# Patient Record
Sex: FEMALE | Race: BLACK OR AFRICAN AMERICAN | NOT HISPANIC OR LATINO | Employment: FULL TIME | URBAN - METROPOLITAN AREA
[De-identification: names, ages, dates, MRNs, and addresses within clinical notes are randomized per-mention and may not be internally consistent; named-entity substitution may affect disease eponyms.]

---

## 2018-12-27 ENCOUNTER — TELEPHONE (OUTPATIENT)
Dept: GYNECOLOGIC ONCOLOGY | Facility: HOSPITAL | Age: 48
End: 2018-12-27

## 2018-12-27 RX ORDER — FLUCONAZOLE 150 MG/1
150 TABLET ORAL ONCE
Qty: 1 TABLET | Refills: 3 | Status: SHIPPED | OUTPATIENT
Start: 2018-12-27 | End: 2018-12-27

## 2018-12-27 NOTE — TELEPHONE ENCOUNTER
Reached patient on her cell phone. Gave her the results of her benign EMBX. Patient has appropriate follow-up scheduled. Did complaint of a yeast infection.  RX sent to [facility].     Alvin Johnson M.D.  PGY2 OB/GYN

## 2019-01-16 ENCOUNTER — TELEPHONE (OUTPATIENT)
Dept: ADMINISTRATIVE | Facility: HOSPITAL | Age: 49
End: 2019-01-16

## 2019-01-21 ENCOUNTER — TELEPHONE (OUTPATIENT)
Dept: ADMINISTRATIVE | Facility: HOSPITAL | Age: 49
End: 2019-01-21

## 2019-01-23 PROBLEM — N93.9 ABNORMAL UTERINE BLEEDING (AUB): Status: ACTIVE | Noted: 2019-01-23

## 2019-03-26 PROBLEM — D21.9 LEIOMYOMA: Status: ACTIVE | Noted: 2019-03-26

## 2019-04-01 ENCOUNTER — PATIENT OUTREACH (OUTPATIENT)
Dept: ADMINISTRATIVE | Facility: HOSPITAL | Age: 49
End: 2019-04-01

## 2019-07-12 PROBLEM — N93.9 ABNORMAL UTERINE BLEEDING (AUB): Chronic | Status: ACTIVE | Noted: 2019-01-23

## 2019-07-15 PROBLEM — Z98.890 S/P ENDOMETRIAL ABLATION: Status: ACTIVE | Noted: 2019-07-15

## 2021-02-10 PROBLEM — R93.5 ABNORMAL ENDOMETRIAL ULTRASOUND: Status: ACTIVE | Noted: 2021-02-10

## 2021-02-10 PROBLEM — R10.2 PELVIC PAIN: Status: ACTIVE | Noted: 2021-02-10

## 2021-02-17 PROBLEM — Z90.710 STATUS POST TOTAL ABDOMINAL HYSTERECTOMY: Status: ACTIVE | Noted: 2021-02-17

## 2021-02-18 PROBLEM — R50.82 POSTOPERATIVE FEVER: Status: ACTIVE | Noted: 2021-02-18

## 2021-02-18 PROBLEM — K91.89 ILEUS, POSTOPERATIVE: Status: ACTIVE | Noted: 2021-02-18

## 2021-02-18 PROBLEM — N17.9 AKI (ACUTE KIDNEY INJURY): Status: ACTIVE | Noted: 2021-02-18

## 2021-02-18 PROBLEM — K56.7 ILEUS, POSTOPERATIVE: Status: ACTIVE | Noted: 2021-02-18

## 2021-02-19 PROBLEM — E87.6 HYPOKALEMIA: Status: ACTIVE | Noted: 2021-02-19

## 2021-02-19 PROBLEM — I10 ESSENTIAL HYPERTENSION: Status: ACTIVE | Noted: 2021-02-19

## 2021-02-19 PROBLEM — D62 ACUTE BLOOD LOSS AS CAUSE OF POSTOPERATIVE ANEMIA: Status: ACTIVE | Noted: 2021-02-19

## 2021-05-06 ENCOUNTER — PATIENT MESSAGE (OUTPATIENT)
Dept: RESEARCH | Facility: HOSPITAL | Age: 51
End: 2021-05-06

## 2021-05-10 ENCOUNTER — PATIENT MESSAGE (OUTPATIENT)
Dept: RESEARCH | Facility: HOSPITAL | Age: 51
End: 2021-05-10

## 2022-01-19 ENCOUNTER — LAB VISIT (OUTPATIENT)
Dept: PRIMARY CARE CLINIC | Facility: OTHER | Age: 52
End: 2022-01-19
Attending: INTERNAL MEDICINE
Payer: MEDICAID

## 2022-01-19 DIAGNOSIS — Z11.52 ENCOUNTER FOR SCREENING FOR SEVERE ACUTE RESPIRATORY SYNDROME CORONAVIRUS 2 (SARS-COV-2) INFECTION: Primary | ICD-10-CM

## 2022-01-19 LAB
CTP QC/QA: YES
SARS-COV-2 AG RESP QL IA.RAPID: POSITIVE

## 2022-01-19 PROCEDURE — 87811 SARS-COV-2 COVID19 W/OPTIC: CPT

## 2022-03-07 DIAGNOSIS — Z12.31 OTHER SCREENING MAMMOGRAM: ICD-10-CM

## 2022-04-04 ENCOUNTER — PATIENT MESSAGE (OUTPATIENT)
Dept: ADMINISTRATIVE | Facility: HOSPITAL | Age: 52
End: 2022-04-04
Payer: MEDICAID

## 2022-04-13 DIAGNOSIS — Z12.11 COLON CANCER SCREENING: ICD-10-CM

## 2022-04-28 ENCOUNTER — HOSPITAL ENCOUNTER (EMERGENCY)
Facility: HOSPITAL | Age: 52
Discharge: HOME | End: 2022-04-28
Attending: EMERGENCY MEDICINE
Payer: MEDICAID

## 2022-04-28 VITALS
RESPIRATION RATE: 18 BRPM | HEART RATE: 74 BPM | OXYGEN SATURATION: 100 % | WEIGHT: 189.81 LBS | TEMPERATURE: 98 F | HEIGHT: 64 IN | SYSTOLIC BLOOD PRESSURE: 145 MMHG | BODY MASS INDEX: 32.41 KG/M2 | DIASTOLIC BLOOD PRESSURE: 73 MMHG

## 2022-04-28 DIAGNOSIS — R51.9 OCCIPITAL HEADACHE: Primary | ICD-10-CM

## 2022-04-28 DIAGNOSIS — R03.0 ELEVATED BLOOD PRESSURE READING: ICD-10-CM

## 2022-04-28 DIAGNOSIS — R06.02 SHORTNESS OF BREATH: ICD-10-CM

## 2022-04-28 LAB
ALBUMIN SERPL BCP-MCNC: 4.4 G/DL (ref 3.5–5.2)
ALP SERPL-CCNC: 84 U/L (ref 38–126)
ALT SERPL W/O P-5'-P-CCNC: 13 U/L (ref 10–44)
ANION GAP SERPL CALC-SCNC: 10 MMOL/L (ref 8–16)
AST SERPL-CCNC: 20 U/L (ref 15–46)
BASOPHILS # BLD AUTO: 0.03 K/UL (ref 0–0.2)
BASOPHILS NFR BLD: 0.5 % (ref 0–1.9)
BILIRUB SERPL-MCNC: 0.2 MG/DL (ref 0.1–1)
CALCIUM SERPL-MCNC: 8.9 MG/DL (ref 8.7–10.5)
CHLORIDE SERPL-SCNC: 106 MMOL/L (ref 95–110)
CO2 SERPL-SCNC: 25 MMOL/L (ref 23–29)
CREAT SERPL-MCNC: 0.64 MG/DL (ref 0.5–1.4)
DIFFERENTIAL METHOD: NORMAL
EOSINOPHIL # BLD AUTO: 0.2 K/UL (ref 0–0.5)
EOSINOPHIL NFR BLD: 2.9 % (ref 0–8)
ERYTHROCYTE [DISTWIDTH] IN BLOOD BY AUTOMATED COUNT: 12.5 % (ref 11.5–14.5)
EST. GFR  (AFRICAN AMERICAN): >60 ML/MIN/1.73 M^2
EST. GFR  (NON AFRICAN AMERICAN): >60 ML/MIN/1.73 M^2
GLUCOSE SERPL-MCNC: 100 MG/DL (ref 70–110)
HCT VFR BLD AUTO: 37.3 % (ref 37–48.5)
HGB BLD-MCNC: 12.5 G/DL (ref 12–16)
IMM GRANULOCYTES # BLD AUTO: 0.01 K/UL (ref 0–0.04)
IMM GRANULOCYTES NFR BLD AUTO: 0.2 % (ref 0–0.5)
LYMPHOCYTES # BLD AUTO: 1.4 K/UL (ref 1–4.8)
LYMPHOCYTES NFR BLD: 25.1 % (ref 18–48)
MCH RBC QN AUTO: 29 PG (ref 27–31)
MCHC RBC AUTO-ENTMCNC: 33.5 G/DL (ref 32–36)
MCV RBC AUTO: 87 FL (ref 82–98)
MONOCYTES # BLD AUTO: 0.6 K/UL (ref 0.3–1)
MONOCYTES NFR BLD: 9.8 % (ref 4–15)
NEUTROPHILS # BLD AUTO: 3.5 K/UL (ref 1.8–7.7)
NEUTROPHILS NFR BLD: 61.5 % (ref 38–73)
NRBC BLD-RTO: 0 /100 WBC
NT-PROBNP SERPL-MCNC: 34 PG/ML (ref 5–450)
PLATELET # BLD AUTO: 191 K/UL (ref 150–450)
PMV BLD AUTO: 10.8 FL (ref 9.2–12.9)
POTASSIUM SERPL-SCNC: 3.6 MMOL/L (ref 3.5–5.1)
PROT SERPL-MCNC: 7.8 G/DL (ref 6–8.4)
RBC # BLD AUTO: 4.31 M/UL (ref 4–5.4)
SODIUM SERPL-SCNC: 141 MMOL/L (ref 136–145)
TROPONIN I SERPL-MCNC: <0.012 NG/ML (ref 0.01–0.03)
UUN UR-MCNC: 10 MG/DL (ref 7–17)
WBC # BLD AUTO: 5.61 K/UL (ref 3.9–12.7)

## 2022-04-28 PROCEDURE — 83880 ASSAY OF NATRIURETIC PEPTIDE: CPT | Mod: ER | Performed by: PHYSICIAN ASSISTANT

## 2022-04-28 PROCEDURE — 93005 ELECTROCARDIOGRAM TRACING: CPT | Mod: ER

## 2022-04-28 PROCEDURE — 25000003 PHARM REV CODE 250: Mod: ER | Performed by: PHYSICIAN ASSISTANT

## 2022-04-28 PROCEDURE — 93010 ELECTROCARDIOGRAM REPORT: CPT | Mod: ,,, | Performed by: INTERNAL MEDICINE

## 2022-04-28 PROCEDURE — 80053 COMPREHEN METABOLIC PANEL: CPT | Mod: ER | Performed by: PHYSICIAN ASSISTANT

## 2022-04-28 PROCEDURE — 84484 ASSAY OF TROPONIN QUANT: CPT | Mod: ER | Performed by: PHYSICIAN ASSISTANT

## 2022-04-28 PROCEDURE — 93010 EKG 12-LEAD: ICD-10-PCS | Mod: ,,, | Performed by: INTERNAL MEDICINE

## 2022-04-28 PROCEDURE — 85025 COMPLETE CBC W/AUTO DIFF WBC: CPT | Mod: ER | Performed by: PHYSICIAN ASSISTANT

## 2022-04-28 PROCEDURE — 99285 EMERGENCY DEPT VISIT HI MDM: CPT | Mod: 25,ER

## 2022-04-28 RX ORDER — ACETAMINOPHEN 500 MG
1000 TABLET ORAL
Status: COMPLETED | OUTPATIENT
Start: 2022-04-28 | End: 2022-04-28

## 2022-04-28 RX ADMIN — ACETAMINOPHEN 1000 MG: 500 TABLET ORAL at 12:04

## 2022-04-28 NOTE — Clinical Note
"Yoni"Shikha Tena was seen and treated in our emergency department on 4/28/2022.  She may return to work on 04/30/2022.       If you have any questions or concerns, please don't hesitate to call.      CATRACHITA Peres"

## 2022-04-28 NOTE — DISCHARGE INSTRUCTIONS
Take Blood pressure log, write down blood pressure twice per day and follow up with primary care physician within 1 week to discuss need for starting antihypertensive (BP lowering) medications     Return to the ER for any change or worsening of symptoms.

## 2022-04-28 NOTE — ED PROVIDER NOTES
"Encounter Date: 4/28/2022       History     Chief Complaint   Patient presents with    Hypertension     PT reports Tuesday blood pressure was high. PT reports today she started to get a headache and BP was 145/101. PT reports "alittle bit of SOB" Denies chest     50 y/o female with history of back pain and iron deficiency anemia presents to the ER with complaint of elevated blood pressure.  She reports that she is "working too much and maybe a sinus headache, not an out of ordinary headache".  She says that her head feels "tight and stuffy". She had her BP checked at work due to headache and noted to have BP of 145/102.  She reports headache intermittent for a few days in occipital area. She denies neck stiffness, nausea, vomiting, or fever.   She denies chest pain.  She reports mild shortness of breath and describes this as feeling fatigued.    She denies nausea, vomiting, fever or cough. She denies vision changes, weakness or numbness of extremities.  She drove herself to the ER from work today.    She was previously on HCTZ for leg swelling , but is no longer prescribed this medication.   She was seen by pain management clinic 2 days ago for consultation for cortisone injections, her BP was elevated at that time 140s/100.  No other complaints at this time.          Review of patient's allergies indicates:  No Known Allergies  Past Medical History:   Diagnosis Date    Abnormal uterine bleeding (AUB)     Back pain     CHA (iron deficiency anemia)     Pelvic pain      Past Surgical History:   Procedure Laterality Date    DILATION AND CURETTAGE OF UTERUS USING SUCTION N/A 2/10/2021    Procedure: DILATION AND CURETTAGE, UTERUS, USING SUCTION;  Surgeon: Cristobal Song MD;  [facility];  Service: OB/GYN;  Laterality: N/A;    HYSTEROSCOPY N/A 7/15/2019    Procedure: HYSTEROSCOPY;  Surgeon: Roseann Lauren MD;  [facility];  Service: OB/GYN;  Laterality: N/A;    HYSTEROSCOPY WITH DILATION AND " CURETTAGE OF UTERUS N/A 2/10/2021    Procedure: HYSTEROSCOPY, WITH DILATION AND CURETTAGE OF UTERUS;  Surgeon: Cristobal Song MD;  Location: [facility] OR;  Service: OB/GYN;  Laterality: N/A;    SALPINGECTOMY Right 2/10/2021    Procedure: SALPINGECTOMY;  Surgeon: Cristobal Song MD;  Location: [facility] OR;  Service: OB/GYN;  Laterality: Right;    SALPINGOOPHORECTOMY Left 2/10/2021    Procedure: SALPINGO-OOPHORECTOMY;  Surgeon: Cristobal Song MD;  Location: [facility] OR;  Service: OB/GYN;  Laterality: Left;  with Left Ovarian cystectomy    THERMAL ABLATION OF ENDOMETRIUM USING HYSTEROSCOPY N/A 7/15/2019    Procedure: ABLATION, ENDOMETRIUM, THERMAL, HYSTEROSCOPIC;  Surgeon: Roseann Lauren MD;  Location: [facility] OR;  Service: OB/GYN;  Laterality: N/A;    TOTAL ABDOMINAL HYSTERECTOMY N/A 2/10/2021    Procedure: HYSTERECTOMY, TOTAL, ABDOMINAL;  Surgeon: Cristobal Song MD;  Location[facility];  Service: OB/GYN;  Laterality: N/A;  DONE  EMERGENCY     TUBAL LIGATION       Family History   Problem Relation Age of Onset    Lupus Mother     No Known Problems Father      Social History     Tobacco Use    Smoking status: Former Smoker     Packs/day: 0.30    Smokeless tobacco: Never Used    Tobacco comment: 3 cigarettes per day   Substance Use Topics    Alcohol use: Yes     Comment: occasionally    Drug use: No     Review of Systems   Constitutional: Negative for chills and fever.   HENT: Negative for sore throat.    Respiratory: Positive for shortness of breath.    Cardiovascular: Negative for chest pain.   Gastrointestinal: Negative for abdominal pain, nausea and vomiting.   Genitourinary: Negative for dysuria.   Musculoskeletal: Negative for back pain.   Skin: Negative for rash.   Neurological: Positive for headaches. Negative for dizziness, syncope, weakness and light-headedness.   Hematological: Does not bruise/bleed easily.   Psychiatric/Behavioral: Negative for confusion.       Physical Exam     Initial Vitals [04/28/22 1121]   BP  Pulse Resp Temp SpO2   (!) 161/78 80 18 98.1 °F (36.7 °C) 100 %      MAP       --         Physical Exam    Nursing note and vitals reviewed.  Constitutional: She appears well-developed and well-nourished.   HENT:   Head: Atraumatic.   Eyes: Conjunctivae and EOM are normal. Pupils are equal, round, and reactive to light.   Neck: Neck supple.   Normal range of motion.  Cardiovascular: Normal rate, regular rhythm and intact distal pulses.   Pulmonary/Chest: Breath sounds normal. No respiratory distress. She has no wheezes. She has no rales.   Abdominal: Abdomen is soft. Bowel sounds are normal. There is no abdominal tenderness.   Musculoskeletal:      Cervical back: Normal range of motion and neck supple.     Neurological: She is alert and oriented to person, place, and time. She has normal strength. No cranial nerve deficit or sensory deficit. GCS score is 15. GCS eye subscore is 4. GCS verbal subscore is 5. GCS motor subscore is 6.   Skin: Capillary refill takes less than 2 seconds. No rash noted.   Psychiatric: She has a normal mood and affect.         ED Course   Procedures  Labs Reviewed   CBC W/ AUTO DIFFERENTIAL   COMPREHENSIVE METABOLIC PANEL   NT-PRO NATRIURETIC PEPTIDE   TROPONIN I          Imaging Results          X-Ray Chest AP Portable (Final result)  Result time 04/28/22 12:24:36    Final result by Norman Russell MD (04/28/22 12:24:36)                 Impression:      No acute abnormality.  No significant interval change.      Electronically signed by: Nroman Russell  Date:    04/28/2022  Time:    12:24             Narrative:    EXAMINATION:  XR CHEST AP PORTABLE    CLINICAL HISTORY:  Shortness of breath    TECHNIQUE:  Single frontal view of the chest was performed.    COMPARISON:  Chest radiograph 02/15/2021.    FINDINGS:  Low lung volumes.The lungs are otherwise clear, with normal appearance of pulmonary vasculature and no pleural effusion or pneumothorax.    The cardiac silhouette is normal in  "size. The hilar and mediastinal contours are unremarkable.    Bones are intact.                                 Medications   acetaminophen tablet 1,000 mg (1,000 mg Oral Given 4/28/22 1214)           APC / Resident Notes:   The patient's blood pressure is elevated here in the emergency department to 161/78. She presented to the ER with complaint of elevated BP and mild headache at work today.   She reports mild discomfort in occipital area rated 3/10.  She is given tylenol with improvement.  BP also improved to 145/73.  I reports feeling of sinus congestion.  I have advised that she take zyrtec and tylenol at home.     I reviewed the patient's chart. Patient seen in the ED on 10/2020 with headache, elevated blood pressure to 170/104 states no history of HTN and stated that normally her BP runs 130/60.  She was on HCTZ prn for intermittent leg swelling, but has been off of this medication by instruction of her PCP.    During ED visit 10/2020 patient had a normal CT of head with "Mild left maxillary sinus mucosal thickening."      I have obtained blood work.  CBC, CMP, troponin, BMP are within normal limits.  EKG shows normal sinus rhythm, no evidence of acute ischemia.  Chest x-ray is negative for acute or infectious process.I do not suspect ACS or other acute cardiopulmonary process.     Based on the patient's presentation today I do not see any signs of endorgan damage representing hypertensive urgency or emergency.  I do not think the patient's presentation necessitates further intervention in the Emergency Department to acutely decrease her blood pressure.  She will likely need to start antihypertensives with her primary care, but I have advised that she take a blood pressure log at home and follow-up with her primary care physician within 1 week for ER follow-up exam and repeat blood prsesure .  Patient is comfortable with this plan.  She is given ER return precautions.  I discussed the care this patient my " supervising physician.                   Clinical Impression:   Final diagnoses:  [R03.0] Elevated blood pressure reading  [R06.02] Shortness of breath  [R51.9] Occipital headache (Primary)          ED Disposition Condition    Discharge Stable        ED Prescriptions     None        Follow-up Information     Follow up With Specialties Details Why Contact Info    Zee Plascencia NP Internal Medicine Call in 1 day To discuss ER visit and schedule follow up appointment within 1 week for repeat Blood pressure and ED follow up exam [address] [facility] [id number]  [phone]             CATRACHITA Peres  04/28/22 1344

## 2022-07-11 ENCOUNTER — PATIENT MESSAGE (OUTPATIENT)
Dept: ADMINISTRATIVE | Facility: HOSPITAL | Age: 52
End: 2022-07-11
Payer: MEDICAID

## 2022-10-03 ENCOUNTER — PATIENT MESSAGE (OUTPATIENT)
Dept: ADMINISTRATIVE | Facility: HOSPITAL | Age: 52
End: 2022-10-03
Payer: MEDICAID

## 2022-10-25 ENCOUNTER — PATIENT OUTREACH (OUTPATIENT)
Dept: ADMINISTRATIVE | Facility: HOSPITAL | Age: 52
End: 2022-10-25
Payer: MEDICAID

## 2022-10-25 DIAGNOSIS — I10 ESSENTIAL HYPERTENSION: Primary | ICD-10-CM

## 2022-12-10 ENCOUNTER — LAB VISIT (OUTPATIENT)
Dept: LAB | Facility: HOSPITAL | Age: 52
End: 2022-12-10
Payer: MEDICAID

## 2022-12-10 DIAGNOSIS — Z12.11 COLON CANCER SCREENING: ICD-10-CM

## 2022-12-15 LAB — HEMOCCULT STL QL IA: NEGATIVE

## 2022-12-15 PROCEDURE — 82274 ASSAY TEST FOR BLOOD FECAL: CPT | Performed by: NURSE PRACTITIONER

## 2022-12-23 ENCOUNTER — PATIENT OUTREACH (OUTPATIENT)
Dept: ADMINISTRATIVE | Facility: HOSPITAL | Age: 52
End: 2022-12-23
Payer: MEDICAID

## 2022-12-23 NOTE — PROGRESS NOTES
Chart reviewed, immunization record updated.  No new results noted on [facility] or [facility] web site.  Care Everywhere updated.   Patient care coordination note  Next PCP visit 06/06/2023  LOV with PCP 12/06/2022  Contacted pt and scheduled another mmg

## 2023-02-06 ENCOUNTER — PATIENT OUTREACH (OUTPATIENT)
Dept: ADMINISTRATIVE | Facility: HOSPITAL | Age: 53
End: 2023-02-06
Payer: MEDICAID

## 2023-02-06 DIAGNOSIS — Z00.00 ENCOUNTER FOR PREVENTIVE CARE: Primary | ICD-10-CM

## 2023-04-03 ENCOUNTER — PATIENT MESSAGE (OUTPATIENT)
Dept: ADMINISTRATIVE | Facility: HOSPITAL | Age: 53
End: 2023-04-03
Payer: MEDICAID

## 2023-04-13 PROBLEM — R20.0 LEFT FACIAL NUMBNESS: Status: ACTIVE | Noted: 2023-04-13

## 2023-04-13 PROBLEM — R03.0 ELEVATED BLOOD PRESSURE READING: Status: ACTIVE | Noted: 2023-04-13

## 2023-06-06 PROBLEM — N93.9 ABNORMAL UTERINE BLEEDING (AUB): Chronic | Status: RESOLVED | Noted: 2019-01-23 | Resolved: 2023-06-06

## 2023-06-06 PROBLEM — R93.5 ABNORMAL ENDOMETRIAL ULTRASOUND: Status: RESOLVED | Noted: 2021-02-10 | Resolved: 2023-06-06

## 2023-06-06 PROBLEM — Z98.890 S/P ENDOMETRIAL ABLATION: Status: RESOLVED | Noted: 2019-07-15 | Resolved: 2023-06-06

## 2023-07-17 ENCOUNTER — CLINICAL SUPPORT (OUTPATIENT)
Dept: FAMILY MEDICINE | Facility: CLINIC | Age: 53
End: 2023-07-17

## 2023-07-17 DIAGNOSIS — Z00.00 GENERAL MEDICAL EXAM: Primary | ICD-10-CM

## 2023-07-17 PROCEDURE — 80305 PR NON-DOT DRUG SCREENS: ICD-10-PCS | Mod: S$GLB,,, | Performed by: FAMILY MEDICINE

## 2023-07-17 PROCEDURE — 80305 DRUG TEST PRSMV DIR OPT OBS: CPT | Mod: S$GLB,,, | Performed by: FAMILY MEDICINE

## 2023-07-17 PROCEDURE — 99202 PR OFFICE/OUTPT VISIT, NEW, LEVL II, 15-29 MIN: ICD-10-PCS | Mod: S$GLB,,, | Performed by: FAMILY MEDICINE

## 2023-07-17 PROCEDURE — 99202 OFFICE O/P NEW SF 15 MIN: CPT | Mod: S$GLB,,, | Performed by: FAMILY MEDICINE

## 2023-07-17 NOTE — PROGRESS NOTES
Yoni has presented today on behalf of [facility] Yoni Tena has completed Non-DOT Physical and Non-DOT Drug Screen .     Post accident     Susan Horvath

## 2023-07-21 ENCOUNTER — TELEPHONE (OUTPATIENT)
Dept: FAMILY MEDICINE | Facility: CLINIC | Age: 53
End: 2023-07-21
Payer: MEDICAID

## 2023-07-21 NOTE — LETTER
July 21, 2023      [address] [address]  Phone: [phone]  Fax: [phone]       Patient: Yoni Tena   YOB: 1970  Date of Visit: 07/21/2023    To Whom It May Concern:    Dony Tena  was at [facility] on 07/21/2023. The patient may return to work on July 22 with no restrictions. If you have any questions or concerns, or if I can be of further assistance, please do not hesitate to contact me.    Sincerely,    Cash Woodward MD

## 2023-08-03 ENCOUNTER — PATIENT OUTREACH (OUTPATIENT)
Dept: ADMINISTRATIVE | Facility: HOSPITAL | Age: 53
End: 2023-08-03
Payer: MEDICAID

## 2023-08-07 ENCOUNTER — PATIENT OUTREACH (OUTPATIENT)
Dept: ADMINISTRATIVE | Facility: HOSPITAL | Age: 53
End: 2023-08-07
Payer: MEDICAID

## 2023-08-07 DIAGNOSIS — Z12.11 SCREENING FOR COLORECTAL CANCER: Primary | ICD-10-CM

## 2023-08-07 DIAGNOSIS — Z12.12 SCREENING FOR COLORECTAL CANCER: Primary | ICD-10-CM

## 2024-02-16 ENCOUNTER — PATIENT OUTREACH (OUTPATIENT)
Dept: ADMINISTRATIVE | Facility: HOSPITAL | Age: 54
End: 2024-02-16
Payer: MEDICAID

## 2024-02-16 DIAGNOSIS — Z12.11 ENCOUNTER FOR SCREENING FOR COLORECTAL MALIGNANT NEOPLASM: Primary | ICD-10-CM

## 2024-02-16 DIAGNOSIS — Z12.12 ENCOUNTER FOR SCREENING FOR COLORECTAL MALIGNANT NEOPLASM: Primary | ICD-10-CM

## 2024-06-27 ENCOUNTER — PATIENT OUTREACH (OUTPATIENT)
Dept: ADMINISTRATIVE | Facility: HOSPITAL | Age: 54
End: 2024-06-27
Payer: MEDICAID

## 2024-06-27 DIAGNOSIS — Z12.31 ENCOUNTER FOR SCREENING MAMMOGRAM FOR BREAST CANCER: Primary | ICD-10-CM

## 2024-11-19 ENCOUNTER — TELEPHONE (OUTPATIENT)
Dept: ADMINISTRATIVE | Facility: CLINIC | Age: 54
End: 2024-11-19

## 2024-11-19 NOTE — PROGRESS NOTES
ED Navigator called to f/u per ED visit .Pt states she spoke to someone regrding scheduling her U/s. Pt encouraged to reach out with any concerns at they arise.

## 2025-01-06 DIAGNOSIS — M79.661 PAIN OF RIGHT LOWER LEG: Primary | ICD-10-CM

## 2025-01-08 DIAGNOSIS — I86.8 VARICOSE VEINS OF OTHER SPECIFIED SITES: Primary | ICD-10-CM

## 2025-02-25 ENCOUNTER — HOSPITAL ENCOUNTER (EMERGENCY)
Facility: HOSPITAL | Age: 55
Discharge: HOME | End: 2025-02-25
Attending: EMERGENCY MEDICINE

## 2025-02-25 ENCOUNTER — OFFICE VISIT (OUTPATIENT)
Dept: CARDIOLOGY | Facility: CLINIC | Age: 55
End: 2025-02-25

## 2025-02-25 VITALS
HEART RATE: 74 BPM | SYSTOLIC BLOOD PRESSURE: 120 MMHG | DIASTOLIC BLOOD PRESSURE: 70 MMHG | WEIGHT: 186.5 LBS | BODY MASS INDEX: 31.84 KG/M2 | HEIGHT: 64 IN

## 2025-02-25 VITALS
TEMPERATURE: 98 F | HEART RATE: 70 BPM | HEIGHT: 64 IN | WEIGHT: 186 LBS | DIASTOLIC BLOOD PRESSURE: 78 MMHG | SYSTOLIC BLOOD PRESSURE: 120 MMHG | RESPIRATION RATE: 16 BRPM | BODY MASS INDEX: 31.76 KG/M2 | OXYGEN SATURATION: 100 %

## 2025-02-25 DIAGNOSIS — M25.562 CHRONIC PAIN OF LEFT KNEE: ICD-10-CM

## 2025-02-25 DIAGNOSIS — M70.42 PREPATELLAR BURSITIS OF LEFT KNEE: Primary | ICD-10-CM

## 2025-02-25 DIAGNOSIS — M25.569 KNEE PAIN: ICD-10-CM

## 2025-02-25 DIAGNOSIS — I83.813 VARICOSE VEINS OF BILATERAL LOWER EXTREMITIES WITH PAIN: ICD-10-CM

## 2025-02-25 DIAGNOSIS — M79.604 PAIN IN BOTH LOWER EXTREMITIES: Primary | ICD-10-CM

## 2025-02-25 DIAGNOSIS — I86.8 VARICOSE VEINS OF OTHER SPECIFIED SITES: ICD-10-CM

## 2025-02-25 DIAGNOSIS — G89.29 CHRONIC PAIN OF LEFT KNEE: ICD-10-CM

## 2025-02-25 DIAGNOSIS — M79.605 PAIN IN BOTH LOWER EXTREMITIES: Primary | ICD-10-CM

## 2025-02-25 LAB
ALBUMIN SERPL BCP-MCNC: 3.9 G/DL (ref 3.5–5.2)
ALP SERPL-CCNC: 81 U/L (ref 40–150)
ALT SERPL W/O P-5'-P-CCNC: 12 U/L (ref 10–44)
ANION GAP SERPL CALC-SCNC: 8 MMOL/L (ref 8–16)
AST SERPL-CCNC: 23 U/L (ref 10–40)
BASOPHILS # BLD AUTO: 0.02 K/UL (ref 0–0.2)
BASOPHILS NFR BLD: 0.5 % (ref 0–1.9)
BILIRUB SERPL-MCNC: 0.3 MG/DL (ref 0.1–1)
BUN SERPL-MCNC: 17 MG/DL (ref 6–20)
CALCIUM SERPL-MCNC: 9.7 MG/DL (ref 8.7–10.5)
CHLORIDE SERPL-SCNC: 108 MMOL/L (ref 95–110)
CO2 SERPL-SCNC: 25 MMOL/L (ref 23–29)
CREAT SERPL-MCNC: 0.7 MG/DL (ref 0.5–1.4)
CRP SERPL-MCNC: 1.7 MG/L (ref 0–8.2)
DIFFERENTIAL METHOD BLD: NORMAL
EOSINOPHIL # BLD AUTO: 0.1 K/UL (ref 0–0.5)
EOSINOPHIL NFR BLD: 2.3 % (ref 0–8)
ERYTHROCYTE [DISTWIDTH] IN BLOOD BY AUTOMATED COUNT: 13.8 % (ref 11.5–14.5)
ERYTHROCYTE [SEDIMENTATION RATE] IN BLOOD BY PHOTOMETRIC METHOD: 38 MM/HR (ref 0–36)
EST. GFR  (NO RACE VARIABLE): >60 ML/MIN/1.73 M^2
GLUCOSE SERPL-MCNC: 80 MG/DL (ref 70–110)
HCT VFR BLD AUTO: 40.1 % (ref 37–48.5)
HGB BLD-MCNC: 12.9 G/DL (ref 12–16)
IMM GRANULOCYTES # BLD AUTO: 0.01 K/UL (ref 0–0.04)
IMM GRANULOCYTES NFR BLD AUTO: 0.2 % (ref 0–0.5)
LYMPHOCYTES # BLD AUTO: 1.7 K/UL (ref 1–4.8)
LYMPHOCYTES NFR BLD: 39.8 % (ref 18–48)
MCH RBC QN AUTO: 28.4 PG (ref 27–31)
MCHC RBC AUTO-ENTMCNC: 32.2 G/DL (ref 32–36)
MCV RBC AUTO: 88 FL (ref 82–98)
MONOCYTES # BLD AUTO: 0.5 K/UL (ref 0.3–1)
MONOCYTES NFR BLD: 12.4 % (ref 4–15)
NEUTROPHILS # BLD AUTO: 1.9 K/UL (ref 1.8–7.7)
NEUTROPHILS NFR BLD: 44.8 % (ref 38–73)
NRBC BLD-RTO: 0 /100 WBC
PLATELET # BLD AUTO: 224 K/UL (ref 150–450)
PMV BLD AUTO: 10.9 FL (ref 9.2–12.9)
POTASSIUM SERPL-SCNC: 3.9 MMOL/L (ref 3.5–5.1)
PROT SERPL-MCNC: 7.9 G/DL (ref 6–8.4)
RBC # BLD AUTO: 4.55 M/UL (ref 4–5.4)
SODIUM SERPL-SCNC: 141 MMOL/L (ref 136–145)
URATE SERPL-MCNC: 5.7 MG/DL (ref 2.4–5.7)
WBC # BLD AUTO: 4.27 K/UL (ref 3.9–12.7)

## 2025-02-25 PROCEDURE — 29505 APPLICATION LONG LEG SPLINT: CPT | Mod: LT

## 2025-02-25 PROCEDURE — 96375 TX/PRO/DX INJ NEW DRUG ADDON: CPT

## 2025-02-25 PROCEDURE — 99284 EMERGENCY DEPT VISIT MOD MDM: CPT | Mod: 25,27

## 2025-02-25 PROCEDURE — 99999 PR PBB SHADOW E&M-EST. PATIENT-LVL III: CPT | Mod: PBBFAC,,, | Performed by: INTERNAL MEDICINE

## 2025-02-25 PROCEDURE — 63600175 PHARM REV CODE 636 W HCPCS: Mod: JZ,TB | Performed by: EMERGENCY MEDICINE

## 2025-02-25 PROCEDURE — 85652 RBC SED RATE AUTOMATED: CPT | Performed by: STUDENT IN AN ORGANIZED HEALTH CARE EDUCATION/TRAINING PROGRAM

## 2025-02-25 PROCEDURE — 85025 COMPLETE CBC W/AUTO DIFF WBC: CPT | Performed by: STUDENT IN AN ORGANIZED HEALTH CARE EDUCATION/TRAINING PROGRAM

## 2025-02-25 PROCEDURE — 84550 ASSAY OF BLOOD/URIC ACID: CPT | Performed by: STUDENT IN AN ORGANIZED HEALTH CARE EDUCATION/TRAINING PROGRAM

## 2025-02-25 PROCEDURE — 86140 C-REACTIVE PROTEIN: CPT | Performed by: STUDENT IN AN ORGANIZED HEALTH CARE EDUCATION/TRAINING PROGRAM

## 2025-02-25 PROCEDURE — 96374 THER/PROPH/DIAG INJ IV PUSH: CPT

## 2025-02-25 PROCEDURE — 99213 OFFICE O/P EST LOW 20 MIN: CPT | Mod: PBBFAC | Performed by: INTERNAL MEDICINE

## 2025-02-25 PROCEDURE — 80053 COMPREHEN METABOLIC PANEL: CPT | Performed by: STUDENT IN AN ORGANIZED HEALTH CARE EDUCATION/TRAINING PROGRAM

## 2025-02-25 PROCEDURE — 99203 OFFICE O/P NEW LOW 30 MIN: CPT | Mod: S$PBB,,, | Performed by: INTERNAL MEDICINE

## 2025-02-25 RX ORDER — METHYLPREDNISOLONE 4 MG/1
TABLET ORAL
Qty: 1 EACH | Refills: 0 | Status: SHIPPED | OUTPATIENT
Start: 2025-02-25 | End: 2025-02-25

## 2025-02-25 RX ORDER — KETOROLAC TROMETHAMINE 30 MG/ML
15 INJECTION, SOLUTION INTRAMUSCULAR; INTRAVENOUS
Status: COMPLETED | OUTPATIENT
Start: 2025-02-25 | End: 2025-02-25

## 2025-02-25 RX ORDER — HYDROCODONE BITARTRATE AND ACETAMINOPHEN 5; 325 MG/1; MG/1
1 TABLET ORAL EVERY 8 HOURS PRN
Qty: 12 TABLET | Refills: 0 | Status: SHIPPED | OUTPATIENT
Start: 2025-02-25 | End: 2025-02-26 | Stop reason: SDUPTHER

## 2025-02-25 RX ORDER — METHYLPREDNISOLONE 4 MG/1
TABLET ORAL
Qty: 1 EACH | Refills: 0 | Status: SHIPPED | OUTPATIENT
Start: 2025-02-25

## 2025-02-25 RX ORDER — METHYLPREDNISOLONE SOD SUCC 125 MG
125 VIAL (EA) INJECTION
Status: COMPLETED | OUTPATIENT
Start: 2025-02-25 | End: 2025-02-25

## 2025-02-25 RX ADMIN — KETOROLAC TROMETHAMINE 15 MG: 30 INJECTION, SOLUTION INTRAMUSCULAR; INTRAVENOUS at 04:02

## 2025-02-25 RX ADMIN — METHYLPREDNISOLONE SODIUM SUCCINATE 125 MG: 125 INJECTION, POWDER, FOR SOLUTION INTRAMUSCULAR; INTRAVENOUS at 04:02

## 2025-02-25 NOTE — ED NOTES
Patient identifiers verified and correct for  Ms Tena  C/C:  Left knee swelling and pain SEE NN  APPEARANCE: awake and alert in NAD. PAIN  9/10  SKIN: warm, dry and intact. No breakdown or bruising.  MUSCULOSKELETAL: Patient moving all extremities spontaneously, min swelling or redness noted to left kneee Ambulates independently.  RESPIRATORY: Denies shortness of breath.Respirations unlabored.   CARDIAC: Denies CP, 2+ distal pulses; no peripheral edema  ABDOMEN: S/ND/NT, Denies nausea  : voids spontaneously, denies difficulty  Neurologic: AAO x 4; follows commands equal strength in all extremities; denies numbness/tingling. Denies dizziness  Denies new weknaess

## 2025-02-25 NOTE — Clinical Note
"Yoni"Shikha Tena was seen and treated in our emergency department on 2/25/2025.  She may return to work on 03/04/2025.       If you have any questions or concerns, please don't hesitate to call.      JOSE Sweeney NRP     "

## 2025-02-25 NOTE — PROGRESS NOTES
[facility]      Chief Complaint   Patient presents with    Varicose Veins       Patient ID: Yoni Tena is a 54 y.o. female with HTN, former smoker, obesity, who presents for an initial appointment. Pertinent history events are as follows:     -Pt kindly referred by Nick Kirkland NP for evaluation of varicose veins.    HPI:  Mrs. Tena reports pain and swelling in both legs (L>R) which started 2 years ago. States pain and swelling in both legs is constant. Symptoms are improved with Tylenol and fluid pills. Smokes half pack a day for 10 years. Quit 1 month ago. Works in transportation. BLE Arterial Ultrasound on 11/25/2024 revealed atherosclerotic disease throughout both lower extremity arteries with no sonographic evidence to suggest focal hemodynamically significant stenosis.     Past Medical History:   Diagnosis Date    Abnormal uterine bleeding (AUB)     Back pain     CHA (iron deficiency anemia)     Pelvic pain      Past Surgical History:   Procedure Laterality Date    DILATION AND CURETTAGE OF UTERUS USING SUCTION N/A 2/10/2021    Procedure: DILATION AND CURETTAGE, UTERUS, USING SUCTION;  Surgeon: Cristobal Song MD;  Location: [facility];  Service: OB/GYN;  Laterality: N/A;    HYSTEROSCOPY N/A 7/15/2019    Procedure: HYSTEROSCOPY;  Surgeon: Roseann Lauren MD;  Location: [facility];  Service: OB/GYN;  Laterality: N/A;    HYSTEROSCOPY WITH DILATION AND CURETTAGE OF UTERUS N/A 2/10/2021    Procedure: HYSTEROSCOPY, WITH DILATION AND CURETTAGE OF UTERUS;  Surgeon: Cristobal Song MD;  Location: [facility] OR;  Service: OB/GYN;  Laterality: N/A;    SALPINGECTOMY Right 2/10/2021    Procedure: SALPINGECTOMY;  Surgeon: Cristobal Song MD;  Location: [facility] OR;  Service: OB/GYN;  Laterality: Right;    SALPINGOOPHORECTOMY Left 2/10/2021    Procedure: SALPINGO-OOPHORECTOMY;  Surgeon: Cristobal Song MD;  Location: [facility];  Service: OB/GYN;  Laterality: Left;  with Left Ovarian cystectomy    THERMAL  "ABLATION OF ENDOMETRIUM USING HYSTEROSCOPY N/A 7/15/2019    Procedure: ABLATION, ENDOMETRIUM, THERMAL, HYSTEROSCOPIC;  Surgeon: Roseann Lauren MD;  Location: [facility] OR;  Service: OB/GYN;  Laterality: N/A;    TOTAL ABDOMINAL HYSTERECTOMY N/A 2/10/2021    Procedure: HYSTERECTOMY, TOTAL, ABDOMINAL;  Surgeon: Cristobal Song MD;  Location: [facility];  Service: OB/GYN;  Laterality: N/A;  DONE  EMERGENCY     TUBAL LIGATION       Social History[1]  Family History   Problem Relation Name Age of Onset    Lupus Mother      No Known Problems Father         Review of patient's allergies indicates:  No Known Allergies    Medication List with Changes/Refills   Current Medications    ASPIRIN (ECOTRIN) 81 MG EC TABLET    Take 1 tablet (81 mg total) by mouth once daily.    HYDROCHLOROTHIAZIDE (HYDRODIURIL) 25 MG TABLET    Take 1 tablet (25 mg total) by mouth once daily.    MELOXICAM (MOBIC) 15 MG TABLET    Take 1 tablet (15 mg total) by mouth once daily.    PRAVASTATIN (PRAVACHOL) 40 MG TABLET    Take 1 tablet by mouth once daily       Review of Systems  Constitution: Denies chills, fever, and sweats.  HENT: Denies headaches or blurry vision.  Cardiovascular: Denies chest pain or irregular heart beat.  Respiratory: Denies cough or shortness of breath.  Gastrointestinal: Denies abdominal pain, nausea, or vomiting.  Musculoskeletal: Denies muscle cramps.  Neurological: Denies dizziness or focal weakness.  Psychiatric/Behavioral: Normal mental status.  Hematologic/Lymphatic: Denies bleeding problem or easy bruising/bleeding.  Skin: Denies rash or suspicious lesions    Physical Examination  Ht 5' 4" (1.626 m)   Wt 84.6 kg (186 lb 8.2 oz)   LMP 06/04/2019   BMI 32.01 kg/m²     Constitutional: No acute distress, conversant  HEENT: Sclera anicteric, Pupils equal, round and reactive to light, extraocular motions intact, Oropharynx clear  Neck: No JVD, no carotid bruits  Cardiovascular: regular rate and rhythm, no murmur, rubs or gallops, " normal S1/S2  Pulmonary: Clear to auscultation bilaterally  Abdominal: Abdomen soft, nontender, nondistended, positive bowel sounds  Extremities: BLE's with trace pitting edema and prominent varicose veins  Left knee with fluctuant area of moderate edema and warmth with severe TTP  Right knee with TTP and mild edema   Pulses:  Carotid pulses are 2+ on the right side, and 2+ on the left side.  Radial pulses are 2+ on the right side, and 2+ on the left side.   Femoral pulses are 2+ on the right side, and 2+ on the left side.  Popliteal pulses are 2+ on the right side, and 2+ on the left side.   Dorsalis pedis pulses are 2+ on the right side, and 2+ on the left side.   Posterior tibial pulses are 2+ on the right side, and 2+ on the left side.    Skin: No ecchymosis, erythema, or ulcers  Psych: Alert and oriented x 3, appropriate affect  Neuro: CNII-XII intact, no focal deficits    Labs:  Most Recent Data  CBC:   Lab Results   Component Value Date    WBC 4.47 08/30/2024    HGB 13.3 08/30/2024    HCT 40.5 08/30/2024     08/30/2024    MCV 87 08/30/2024    RDW 13.2 08/30/2024     BMP:   Lab Results   Component Value Date     08/30/2024    K 4.1 08/30/2024     08/30/2024    CO2 25 08/30/2024    BUN 13 08/30/2024    CREATININE 0.8 08/30/2024    GLU 97 08/30/2024    CALCIUM 10.0 08/30/2024    MG 1.9 04/13/2023    PHOS 3.5 04/13/2023     LFTS;   Lab Results   Component Value Date    PROT 8.2 08/30/2024    ALBUMIN 4.0 08/30/2024    BILITOT 0.4 08/30/2024    AST 18 08/30/2024    ALKPHOS 95 08/30/2024    ALT 12 08/30/2024     COAGS:   Lab Results   Component Value Date    INR 1.0 04/13/2023     FLP:   Lab Results   Component Value Date    CHOL 131 08/30/2024    HDL 39 (L) 08/30/2024    LDLCALC 79.6 08/30/2024    TRIG 62 08/30/2024    CHOLHDL 29.8 08/30/2024     CARDIAC:   Lab Results   Component Value Date    TROPONINI <0.012 04/28/2022    BNP <10 10/14/2020       Imaging:    EKG 7/18/2023:  Normal sinus rhythm      BLE Arterial Ultrasound 11/25/2024:  Atherosclerotic disease throughout both lower extremity arteries with no sonographic evidence to suggest focal hemodynamically significant stenosis.     Assessment/Plan:  Yoni Tena is a 54 y.o. female with HTN, former smoker, obesity, who presents for an initial appointment.     Leg Pain- Pt with bilateral leg pain (L>R) Left knee with fluctuant area of moderate edema and warmth with severe TTP.  Pain is out of proportion to exam. Etiology is unclear. Possibilities include gout, septic joint, and structural abnormalities, Knee X-ray on 2/18/2025 is negative. BLE Arterial Ultrasound on 11/25/2024 revealed atherosclerotic disease throughout both lower extremity arteries with no sonographic evidence to suggest focal hemodynamically significant stenosis. Given severe pain out of proportion to exam resulting in pt's inability to walk, I recommend left knee and immediate Ortho evaluation. Pt will be transported to ED for expedited workup as outlined.     2. Varicose veins with leg swelling- Check BLE venous reflux study and ASHLEY study. Recommend wearing graduated compression hose.  Limit sodium intake to 2000 mg daily.  Limit volume intake to 1.5 L daily.  Elevate legs when resting.    3. Obesity- Encourage diet, exercise, and weight loss.    Pt will be transported to ED for expedited workup with MRI left knee and Ortho evaluation   Follow up in 3 months    Total duration of face to face visit time 45 minutes.  Total time spent counseling greater than fifty percent of total visit time.  Counseling included discussion regarding imaging findings, diagnosis, possibilities, treatment options, risks and benefits.  The patient had many questions regarding the options and long-term effects.    Abbe Cleary MD, PhD  Interventional Cardiology         [1]   Social History  Socioeconomic History    Marital status: Single   Tobacco Use    Smoking status: Former     Current  packs/day: 0.00     Types: Cigarettes     Quit date: 2023     Years since quittin.8    Smokeless tobacco: Never    Tobacco comments:     3 cigarettes per day   Substance and Sexual Activity    Alcohol use: Yes     Comment: occasionally    Drug use: No    Sexual activity: Not Currently     Partners: Male     Birth control/protection: See Surgical Hx     Comment: with one partner for 7 years   Social History Narrative    ** Merged History Encounter **

## 2025-02-25 NOTE — FIRST PROVIDER EVALUATION
"Medical screening examination initiated.  I have conducted a focused provider triage encounter, findings are as follows:    Brief history of present illness:  Reports she was told to come for Left leg swelling x 3 weeks after being seen in her cardiology clinic. States pain is mostly in knee and was recommend to come to ED. No reports trauma.     Vitals:    02/25/25 1421   BP: (!) 154/74   Pulse: 70   Resp: 18   Temp: 98.5 °F (36.9 °C)   TempSrc: Oral   SpO2: 100%   Weight: 84.4 kg (186 lb)   Height: 5' 4" (1.626 m)       Pertinent physical exam:  in wheelchair, diffusely tender to palpation of L knee without palpable bony deformity    Brief workup plan:  labs    Preliminary workup initiated; this workup will be continued and followed by the physician or advanced practice provider that is assigned to the patient when roomed.  "

## 2025-02-25 NOTE — ED PROVIDER NOTES
Source of History:  Patient    Chief complaint:  Knee Pain (Sent from clinic L knee pain and swelling)      HPI:  Yoni Tena is a 54 y.o. female presenting with left knee pain for several weeks.  Patient states that her left knee has been hurting for the last several weeks, and increase in pain over she days.  She secondary to history of varicose veins, and her pain and swelling was severe so he sent her to the emergency department for further evaluation.  Eyes any calf pain, chest pain, or shortness of breath.  She denies any fevers.  She denies any history of gout.  She works as a  for patient transport van.  She denies any numbness or tingling.    ROS: As per HPI and below:  General: No fever.  No chills.  Eyes: No visual changes.  Head: No headache.    Integument: No rashes or lesions.  Chest: No shortness of breath.  Cardiovascular: No chest pain.  Abdomen: No abdominal pain.  No nausea or vomiting.  Urinary: No abnormal urination.  Neurologic: No focal weakness.  No numbness.  Musculoskeletal: As above  Hematologic:  As above.  No easy bruising.        Review of patient's allergies indicates:  No Known Allergies    Medications Ordered Prior to Encounter[1]    PMH:  As per HPI and below:  Past Medical History:   Diagnosis Date    Abnormal uterine bleeding (AUB)     Back pain     CHA (iron deficiency anemia)     Pelvic pain      Past Surgical History:   Procedure Laterality Date    DILATION AND CURETTAGE OF UTERUS USING SUCTION N/A 2/10/2021    Procedure: DILATION AND CURETTAGE, UTERUS, USING SUCTION;  Surgeon: Cristobal Song MD;  Location: [facility];  Service: OB/GYN;  Laterality: N/A;    HYSTEROSCOPY N/A 7/15/2019    Procedure: HYSTEROSCOPY;  Surgeon: Roseann Lauren MD;  Location: [facility];  Service: OB/GYN;  Laterality: N/A;    HYSTEROSCOPY WITH DILATION AND CURETTAGE OF UTERUS N/A 2/10/2021    Procedure: HYSTEROSCOPY, WITH DILATION AND CURETTAGE OF UTERUS;  Surgeon: Cristobal Song MD;   "Location: [facility] OR;  Service: OB/GYN;  Laterality: N/A;    SALPINGECTOMY Right 2/10/2021    Procedure: SALPINGECTOMY;  Surgeon: Cristobal Song MD;  Location: [facility] OR;  Service: OB/GYN;  Laterality: Right;    SALPINGOOPHORECTOMY Left 2/10/2021    Procedure: SALPINGO-OOPHORECTOMY;  Surgeon: Cristoabl Song MD;  Location: [facility] OR;  Service: OB/GYN;  Laterality: Left;  with Left Ovarian cystectomy    THERMAL ABLATION OF ENDOMETRIUM USING HYSTEROSCOPY N/A 7/15/2019    Procedure: ABLATION, ENDOMETRIUM, THERMAL, HYSTEROSCOPIC;  Surgeon: Roseann Lauren MD;  Location: [facility] OR;  Service: OB/GYN;  Laterality: N/A;    TOTAL ABDOMINAL HYSTERECTOMY N/A 2/10/2021    Procedure: HYSTERECTOMY, TOTAL, ABDOMINAL;  Surgeon: Cristobal Song MD;  Location: [facility];  Service: OB/GYN;  Laterality: N/A;  DONE  EMERGENCY     TUBAL LIGATION         Social History     Socioeconomic History    Marital status: Single   Tobacco Use    Smoking status: Former     Current packs/day: 0.00     Types: Cigarettes     Quit date: 2023     Years since quittin.8    Smokeless tobacco: Never    Tobacco comments:     3 cigarettes per day   Substance and Sexual Activity    Alcohol use: Yes     Comment: occasionally    Drug use: No    Sexual activity: Not Currently     Partners: Male     Birth control/protection: See Surgical Hx     Comment: with one partner for 7 years   Social History Narrative    ** Merged History Encounter **            Family History   Problem Relation Name Age of Onset    Lupus Mother      No Known Problems Father         Physical Exam:    Vitals:    25 1421 25 1635   BP: (!) 154/74 126/88   Pulse: 70 72   Resp: 18 16   Temp: 98.5 °F (36.9 °C) 97.9 °F (36.6 °C)   TempSrc: Oral Oral   SpO2: 100% 100%   Weight: 84.4 kg (186 lb)    Height: 5' 4" (1.626 m)      Appearance:  Moderate distress secondary to pain.  Skin: No rashes seen.  Good turgor.  No abrasions.  No ecchymoses.  Eyes: No conjunctival injection.  ENT: Oropharynx " clear.    Chest: Clear to auscultation bilaterally.  Good air movement.  No wheezes.  No rhonchi.  Cardiovascular: Regular rate and rhythm.  No murmurs. No gallops. No rubs.  Abdomen: Soft.  Not distended.  Nontender.  No guarding.  No rebound.  Musculoskeletal:  Significant swelling to the prepatellar region of the left knee with a medial swelling greater than the lateral swelling.  She has significant tenderness in this area extending inferiorly.  Patient can flex her knee to approximately 75°, she can not get to 90°.  She is neurovascularly intact distally to this, and has a negative Homans sign.  Her calves are symmetric bilaterally with no tenderness to palpation but  Good range of motion all joints.  No deformities.  Mental Status:  Alert and oriented x 3.  Appropriate, conversant.      Initial Impression:  Acute left knee pain    Labs Reviewed   SEDIMENTATION RATE - Abnormal       Result Value    Sed Rate 38 (*)     Narrative:     Release to patient->Immediate   CBC W/ AUTO DIFFERENTIAL    WBC 4.27      RBC 4.55      Hemoglobin 12.9      Hematocrit 40.1      MCV 88      MCH 28.4      MCHC 32.2      RDW 13.8      Platelets 224      MPV 10.9      Immature Granulocytes 0.2      Gran # (ANC) 1.9      Immature Grans (Abs) 0.01      Lymph # 1.7      Mono # 0.5      Eos # 0.1      Baso # 0.02      nRBC 0      Gran % 44.8      Lymph % 39.8      Mono % 12.4      Eosinophil % 2.3      Basophil % 0.5      Differential Method Automated      Narrative:     Release to patient->Immediate   COMPREHENSIVE METABOLIC PANEL    Sodium 141      Potassium 3.9      Chloride 108      CO2 25      Glucose 80      BUN 17      Creatinine 0.7      Calcium 9.7      Total Protein 7.9      Albumin 3.9      Total Bilirubin 0.3      Alkaline Phosphatase 81      AST 23      ALT 12      eGFR >60.0      Anion Gap 8      Narrative:     Release to patient->Immediate   C-REACTIVE PROTEIN    CRP 1.7      Narrative:     Release to patient->Immediate    URIC ACID    Uric Acid 5.7      Narrative:     Release to patient->Immediate                                    add on Uric Acid-[id number] per Pancho Mcknight III, MD                    02/25/2025  16:52 Aashish       I decided to obtain the patient's medical records.    Imaging Results    None         Medications   ketorolac injection 15 mg (15 mg Intravenous Given 2/25/25 1635)   methylPREDNISolone sodium succinate injection 125 mg (125 mg Intravenous Given 2/25/25 1635)                   MDM:    54 y.o. female with Acute left knee pain.  Her findings are suspicious for prepatellar bursitis, versus septic joint, and even less likely DVT.  I am encouraged that patient is able to flex her knee albeit not to full 90°, it is significant unless likely to be septic joint as she does not have pain out of proportion with this flexion.  Additionally blood work was ordered and she had a normal CBC and CRP with a slightly elevated ESR, both findings not consistent with septic joint.  Additionally her uric acid was normal, making gout less likely as well.  Previous x-ray at time of initiation of pain was negative and I did not repeat today.  Patient will be discharged with prescription for a Medrol Dosepak, as well as hydrocodone for more intense pain.  She has a knee immobilizer, and advised this is for comfort only and to try to keep working through an of her knee.  She has been also, she is discharged in stable condition. She verbalized her understanding and agreement with the diagnosis and plan.      Diagnostic Impression:    1. Prepatellar bursitis of left knee    2. Knee pain         ED Disposition Condition    Discharge Stable          ED Prescriptions       Medication Sig Dispense Start Date End Date Auth. Provider    methylPREDNISolone (MEDROL DOSEPACK) 4 mg tablet  (Status: Discontinued) Take as directed 1 each 2/25/2025 2/25/2025 Pancho Mcknight III, MD    HYDROcodone-acetaminophen (NORCO) 5-325 mg per  tablet Take 1 tablet by mouth every 8 (eight) hours as needed for Pain. 12 tablet 2/25/2025 -- Pancho Mcknight III, MD    methylPREDNISolone (MEDROL DOSEPACK) 4 mg tablet Take as directed 1 each 2/25/2025 -- Pancho Mcknight III, MD          Follow-up Information    None              [1]   No current facility-administered medications on file prior to encounter.     Current Outpatient Medications on File Prior to Encounter   Medication Sig Dispense Refill    pravastatin (PRAVACHOL) 40 MG tablet Take 1 tablet by mouth once daily (Patient not taking: Reported on 2/25/2025) 30 tablet 11    [DISCONTINUED] aspirin (ECOTRIN) 81 MG EC tablet Take 1 tablet (81 mg total) by mouth once daily. 90 tablet 3    [DISCONTINUED] hydroCHLOROthiazide (HYDRODIURIL) 25 MG tablet Take 1 tablet (25 mg total) by mouth once daily. 30 tablet 5    [DISCONTINUED] meloxicam (MOBIC) 15 MG tablet Take 1 tablet (15 mg total) by mouth once daily. 30 tablet 1        Pancho Mcknight III, MD  02/25/25 6753

## 2025-02-25 NOTE — ED NOTES
"Patient states left knee pain for " weeks" sent by PCP, reports swelling and pain Getting relief from taking Lasix 40 mg that is not her rx  "

## 2025-02-25 NOTE — PATIENT INSTRUCTIONS
Assessment/Plan:  Yoni Tena is a 54 y.o. female with HTN, former smoker, obesity, who presents for an initial appointment.     Leg Pain- Pt with bilateral leg pain (L>R) Left knee with fluctuant area of moderate edema and warmth with severe TTP.  Pain is out of proportion to exam. Etiology is unclear. Possibilities include gout, septic joint, and structural abnormalities, Knee X-ray on 2/18/2025 is negative. BLE Arterial Ultrasound on 11/25/2024 revealed atherosclerotic disease throughout both lower extremity arteries with no sonographic evidence to suggest focal hemodynamically significant stenosis. Given severe pain out of proportion to exam resulting in pt's inability to walk, I recommend left knee and immediate Ortho evaluation. Pt will be transported to ED for expedited workup as outlined.     2. Varicose veins with leg swelling- Check BLE venous reflux study and ASHLEY study. Recommend wearing graduated compression hose.  Limit sodium intake to 2000 mg daily.  Limit volume intake to 1.5 L daily.  Elevate legs when resting.    3. Obesity- Encourage diet, exercise, and weight loss.    Pt will be transported to ED for expedited workup with MRI left knee and Ortho evaluation   Follow up in 3 months

## 2025-02-26 DIAGNOSIS — M70.42 PREPATELLAR BURSITIS OF LEFT KNEE: ICD-10-CM

## 2025-02-26 DIAGNOSIS — M25.569 KNEE PAIN: ICD-10-CM

## 2025-02-26 RX ORDER — HYDROCODONE BITARTRATE AND ACETAMINOPHEN 5; 325 MG/1; MG/1
1 TABLET ORAL EVERY 8 HOURS PRN
Qty: 12 TABLET | Refills: 0 | Status: SHIPPED | OUTPATIENT
Start: 2025-02-26

## 2025-02-26 NOTE — PROGRESS NOTES
Emergency Room    -seen and examined in the emergency room on 02/25/2025.  Patient was discharged with Norco that [facility]  -local pharmacy does not have medication in stock.  Prescription was sent to [facility] in [place].  - checked    Margret Mann PA-C

## 2025-02-27 ENCOUNTER — HOSPITAL ENCOUNTER (OUTPATIENT)
Dept: CARDIOLOGY | Facility: HOSPITAL | Age: 55
Discharge: HOME | End: 2025-02-27
Attending: INTERNAL MEDICINE

## 2025-02-27 ENCOUNTER — OFFICE VISIT (OUTPATIENT)
Facility: CLINIC | Age: 55
End: 2025-02-27

## 2025-02-27 VITALS
DIASTOLIC BLOOD PRESSURE: 74 MMHG | WEIGHT: 192.25 LBS | HEIGHT: 64 IN | SYSTOLIC BLOOD PRESSURE: 125 MMHG | BODY MASS INDEX: 32.82 KG/M2

## 2025-02-27 DIAGNOSIS — I86.8 VARICOSE VEINS OF OTHER SPECIFIED SITES: ICD-10-CM

## 2025-02-27 DIAGNOSIS — M25.562 PAIN OF LEFT KNEE AND LOWER LEG: Primary | ICD-10-CM

## 2025-02-27 DIAGNOSIS — M79.604 PAIN IN BOTH LOWER EXTREMITIES: ICD-10-CM

## 2025-02-27 DIAGNOSIS — M79.662 PAIN OF LEFT KNEE AND LOWER LEG: Primary | ICD-10-CM

## 2025-02-27 DIAGNOSIS — M79.605 PAIN IN BOTH LOWER EXTREMITIES: ICD-10-CM

## 2025-02-27 LAB
IMMEDIATE ARM BP: 137 MMHG
IMMEDIATE LEFT ABI: 1.23
IMMEDIATE LEFT TIBIAL: 169 MMHG
IMMEDIATE RIGHT ABI: 1.23
IMMEDIATE RIGHT TIBIAL: 168 MMHG
LEFT ABI: 1.1
LEFT ARM BP: 132 MMHG
LEFT DORSALIS PEDIS: 140 MMHG
LEFT GREAT SAPHENOUS DISTAL THIGH DIA: 0.17 CM
LEFT GREAT SAPHENOUS JUNCTION DIA: 0.64 CM
LEFT GREAT SAPHENOUS KNEE DIA: 0.12 CM
LEFT GREAT SAPHENOUS MIDDLE THIGH DIA: 0.22 CM
LEFT GREAT SAPHENOUS MIDDLE THIGH REFLUX: 747 MS
LEFT POSTERIOR TIBIAL: 155 MMHG
LEFT SMALL SAPHENOUS KNEE DIA: 0.11 CM
RIGHT ABI: 1.04
RIGHT ARM BP: 141 MMHG
RIGHT DORSALIS PEDIS: 128 MMHG
RIGHT GREAT SAPHENOUS DISTAL THIGH DIA: 0.29 CM
RIGHT GREAT SAPHENOUS JUNCTION DIA: 0.46 CM
RIGHT GREAT SAPHENOUS KNEE DIA: 0.22 CM
RIGHT GREAT SAPHENOUS MIDDLE THIGH DIA: 0.4 CM
RIGHT GREAT SAPHENOUS MIDDLE THIGH REFLUX: 3391 MS
RIGHT GREAT SAPHENOUS PROXIMAL CALF DIA: 0.23 CM
RIGHT POSTERIOR TIBIAL: 146 MMHG
RIGHT SMALL SAPHENOUS KNEE DIA: 0.13 CM
TREADMILL GRADE: 12 %
TREADMILL SPEED: 2 MPH
TREADMILL TIME: 5 MIN

## 2025-02-27 PROCEDURE — 93970 EXTREMITY STUDY: CPT | Mod: TC

## 2025-02-27 PROCEDURE — 93970 EXTREMITY STUDY: CPT | Mod: 26,,, | Performed by: INTERNAL MEDICINE

## 2025-02-27 PROCEDURE — 99213 OFFICE O/P EST LOW 20 MIN: CPT | Mod: PBBFAC,25

## 2025-02-27 PROCEDURE — 93924 LWR XTR VASC STDY BILAT: CPT | Mod: 26,,, | Performed by: INTERNAL MEDICINE

## 2025-02-27 PROCEDURE — 99204 OFFICE O/P NEW MOD 45 MIN: CPT | Mod: S$PBB,,,

## 2025-02-27 PROCEDURE — 93924 LWR XTR VASC STDY BILAT: CPT

## 2025-02-27 PROCEDURE — 99999 PR PBB SHADOW E&M-EST. PATIENT-LVL III: CPT | Mod: PBBFAC,,,

## 2025-02-27 NOTE — PROGRESS NOTES
"      AN On license of [facility] - NEUROLOGY 7TH FL [facility], [place]    Date: 2/27/25  Patient Name: Yoni Tena   MRN: [id number]   PCP: Nick Kirkland  Referring Provider: Nick Kirkland, NP    Reason for visit: "Pain of right lower leg"    Details provided by:    Patient    HISTORY OF PRESENT ILLNESS   Ms. Yoni Tena is a 54 y.o. female with PMHx of HTN, obesity, varicose veins in BLE, and chronic pain of L knee presenting for evaluation of pain of right lower leg.    Although the referral was placed for right lower leg pain, patient states that her R leg is not bothering her at all. Instead, she reports that for a little over a year she's been having significant pain in her L leg that is worsening.    The pain is mostly localized around the L knee, but is present from the L knee down. She describes the pain as burning and achy. The pain is constant and has been worsening. She denies any known injury to the L knee. She does report subjective weakness in her L leg, but she also thinks that it may just seem weak since she's in so much pain. Walking has become very painful, and she's walking with a severe limb currently. 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 This pain in her L leg is making it harder for her to fulfill her obligations at work given the reported physical demand. She denies any numbness or tingling in her legs or anywhere else. She denies any low back pain or neck pain.    She does not exercise outside of work. She's been trying to eat healthily. She drinks 1-2 glasses of wine or crown a month. She does not smoke.    Chart Review:  XR L Knee (2/18/25)  Negative left knee series.    MRI Brain (2023)  No evidence of acute infarction. Several small focal nonspecific T2 hyperintensities noted in subcortical white matter and may reflect small nonspecific areas of gliosis or minimal microvascular ischemic change.       Lab Results   Component Value Date    WBC " "4.27 02/25/2025    HGB 12.9 02/25/2025    HCT 40.1 02/25/2025     02/25/2025    CHOL 131 08/30/2024    TRIG 62 08/30/2024    HDL 39 (L) 08/30/2024    ALT 12 02/25/2025    AST 23 02/25/2025     02/25/2025    K 3.9 02/25/2025     02/25/2025    CREATININE 0.7 02/25/2025    BUN 17 02/25/2025    CO2 25 02/25/2025    TSH 1.143 08/30/2024    HGBA1C 5.4 08/30/2024       Review of Systems:  12 system review of systems is negative except for the symptoms mentioned in HPI.     PHYSICAL EXAMINATION     Vitals:    02/27/25 1510   BP: 125/74   BP Location: Left arm   Patient Position: Sitting   Pulse: (P) 67   Weight: 87.2 kg (192 lb 3.9 oz)   Height: 5' 4" (1.626 m)     Wt Readings from Last 3 Encounters:   02/27/25 1510 87.2 kg (192 lb 3.9 oz)   02/25/25 1421 84.4 kg (186 lb)   02/25/25 1259 84.6 kg (186 lb 8.2 oz)     Body mass index is 33 kg/m².     GENERAL/CONSTITUTIONAL/SYSTEMIC:    -Well appearing; well nourished  - L knee and down is significantly tender to palpation  - Mild varicose veins seen in both calves  - Mild-moderate edema from the B/L knees down    HIGHER INTEGRATIVE FUNCTIONS:   -Attention & concentration: Normal   -Orientation: Oriented to person, place & time  -Memory: Normal  -Language: Normal   -Fund of Knowledge: Normal     CRANIAL NERVES:   -CN 2: Visual fields full  -CN 2,3: PERRL  -CN 3,4,6: EOMI  -CN 5: Facial sensation intact bilaterally  -CN 7: Facial strength/movement intact bilaterally  -CN 8: Hearing normal bilaterally  -CN 9,10: Palate elevates symmetrically  -CN 11: Normal shoulder shrug and head turn  -CN 12: Tongue protrudes midline     MOTOR:   -Tone: normal in upper and lower extremities  - 4/5 L hip abduction, 4/5 L knee flexion and extension. Patient guarding the L knee significantly.. Strength is otherwise 5/5 throughout.     SENSATION:   -Intact bilaterally to Vibration and pin prick    REFLEXES:     RIGHT Reflex   LEFT   2+ Biceps 2+   2+ Brachiorad. 2+   2+ Triceps " 2+    Pectoralis     Jaw Jerk    - Garza's -        3+ Patellar Declined 2/2 pain   1+ Ankle 1+    Suprapatellar              Down PLANTAR Down       COORDINATION:   -FNF normal bilaterally    GAIT:   - Significantly antalgic gait 2/2 to L knee pain    Scheduled Follow-up :  Future Appointments   Date Time Provider Department Center   3/3/2025  7:30 AM [facility] MRI1 500 LB LIMIT [facility]   5/27/2025  9:00 AM Abbe Cleary MD PhD [facility]   7/28/2025  9:20 AM [facility]THO CLINIC, A [facility] ORTHO [facility]       After Visit Medication List :     Medication List            Accurate as of February 27, 2025 11:59 PM. If you have any questions, ask your nurse or doctor.                CONTINUE taking these medications      HYDROcodone-acetaminophen 5-325 mg per tablet  Commonly known as: NORCO  Take 1 tablet by mouth every 8 (eight) hours as needed for Pain.     methylPREDNISolone 4 mg tablet  Commonly known as: MEDROL DOSEPACK  Take as directed     pravastatin 40 MG tablet  Commonly known as: PRAVACHOL  Take 1 tablet by mouth once daily                Assessment/Plan:   Ms. Yoni Tena is a 54 y.o. female with PMHx of HTN, obesity, varicose veins in BLE, and chronic pain of L knee presenting for evaluation of pain of right lower leg.    Although referral was placed for pain of R lower leg, patient does not have any pain in her R leg but instead has severe pain surrounding her left knee. Recently seen in the ER for this issue and was dx with prepatellar bursitis. XR L knee was neg.    - I do not believe that the patient is experiencing any neuromuscular problem given history as well as relatively benign exam for neuromuscular etiologies. Suspect that the perceived L leg weakness on exam was caused by her reluctance to move her L leg 2/2 pain.  - Suspect that she's suffering from a soft tissue condition/injury of the L knee that needs to be investigated further with an MRI L knee.  -  Ordered MRI L knee to help expedite her diagnosis and treatment.  - Patient has follow-up with orthopedics, so I will let them take the lead.    Follow-up PRN.    I discussed with the patient in depth the risk/benefits of the following plan and the patient was amenable. We discussed the following:    Problem List Items Addressed This Visit          Orthopedic    Pain of left knee and lower leg - Primary    Relevant Orders    MRI Knee Without Contrast Left            This evaluation was completed in >50  Minutes over 50% of the time spent on education & counseling. This includes face to face time and non-face to face time preparing to see the patient (eg, review of tests), obtaining and/or reviewing separately obtained history, documenting clinical information in the electronic or other health record, independently interpreting results and communicating results to the patient/family/caregiver, or care coordinator.          Marshall J Kellerman, PA-C  Supervising physician Chyna Valle MD was available for all questions during this exam.  [facility]  [address] [address] [place] [address].

## 2025-03-03 ENCOUNTER — HOSPITAL ENCOUNTER (OUTPATIENT)
Dept: RADIOLOGY | Facility: HOSPITAL | Age: 55
Discharge: HOME | End: 2025-03-03

## 2025-03-03 DIAGNOSIS — M79.662 PAIN OF LEFT KNEE AND LOWER LEG: ICD-10-CM

## 2025-03-03 DIAGNOSIS — M25.562 PAIN OF LEFT KNEE AND LOWER LEG: ICD-10-CM

## 2025-03-03 PROCEDURE — 73721 MRI JNT OF LWR EXTRE W/O DYE: CPT | Mod: TC,LT

## 2025-03-03 PROCEDURE — 73721 MRI JNT OF LWR EXTRE W/O DYE: CPT | Mod: 26,LT,, | Performed by: RADIOLOGY

## 2025-03-06 ENCOUNTER — TELEPHONE (OUTPATIENT)
Facility: CLINIC | Age: 55
End: 2025-03-06

## 2025-03-06 NOTE — TELEPHONE ENCOUNTER
Spoke with patient to go over recent MRI L knee results that showed a torn lateral meniscus. Explained to the patient that I will try to get in touch with the orthopedics office to get her appointment that's currently scheduled for 7/28/25 moved up to a sooner date if possible. Patient expressed verbal understanding.

## 2025-03-18 ENCOUNTER — OFFICE VISIT (OUTPATIENT)
Dept: ORTHOPEDICS | Facility: CLINIC | Age: 55
End: 2025-03-18

## 2025-03-18 VITALS — BODY MASS INDEX: 33.3 KG/M2 | WEIGHT: 194 LBS

## 2025-03-18 DIAGNOSIS — M17.12 PRIMARY OSTEOARTHRITIS OF LEFT KNEE: ICD-10-CM

## 2025-03-18 DIAGNOSIS — M25.462 EFFUSION OF LEFT KNEE: ICD-10-CM

## 2025-03-18 DIAGNOSIS — S83.282A ACUTE LATERAL MENISCUS TEAR OF LEFT KNEE, INITIAL ENCOUNTER: Primary | ICD-10-CM

## 2025-03-18 DIAGNOSIS — M70.42 PREPATELLAR BURSITIS OF LEFT KNEE: ICD-10-CM

## 2025-03-18 PROCEDURE — 99213 OFFICE O/P EST LOW 20 MIN: CPT | Mod: PBBFAC

## 2025-03-18 PROCEDURE — 20610 DRAIN/INJ JOINT/BURSA W/O US: CPT | Mod: PBBFAC,LT

## 2025-03-18 PROCEDURE — 99213 OFFICE O/P EST LOW 20 MIN: CPT | Mod: S$PBB,25,,

## 2025-03-18 PROCEDURE — 20610 DRAIN/INJ JOINT/BURSA W/O US: CPT | Mod: S$PBB,LT,,

## 2025-03-18 PROCEDURE — 99999 PR PBB SHADOW E&M-EST. PATIENT-LVL III: CPT | Mod: PBBFAC,,,

## 2025-03-18 PROCEDURE — 99999PBSHW PR PBB SHADOW TECHNICAL ONLY FILED TO HB: Mod: PBBFAC,,,

## 2025-03-18 RX ORDER — TRIAMCINOLONE ACETONIDE 40 MG/ML
40 INJECTION, SUSPENSION INTRA-ARTICULAR; INTRAMUSCULAR ONCE
Status: COMPLETED | OUTPATIENT
Start: 2025-03-18 | End: 2025-03-18

## 2025-03-18 RX ADMIN — TRIAMCINOLONE ACETONIDE 40 MG: 40 INJECTION, SUSPENSION INTRA-ARTICULAR; INTRAMUSCULAR at 08:03

## 2025-03-18 NOTE — PROGRESS NOTES
"  SUBJECTIVE:     Chief Complaint & History of Present Illness:  History of Present Illness    CHIEF COMPLAINT:  - Left knee pain and swelling    HPI:  Ms. Tena presents with left knee pain ongoing for 2-3 months. She initially thought it was cardiac-related, possibly a blood clot, and was treated as such until an MRI was performed. 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 She did not feel knee pain immediately but noticed it later.    The pain is described as "all over the knee." Her knee feels very stiff, swollen, and localizes pain globally. She reports occasional left knee instability and states that it seems to be worsening. She has difficulty bearing weight and was observed limping into the exam room.    Ms. Tena went to the emergency room in late February, where X-rays were taken. She was given steroids and Norco, which have not been particularly helpful. She has been wearing a knee brace purchased from [employer], which provides some support. Ms. Tena returned to work after a week but reports difficulty performing her duties.    Prior to this visit, she consulted with a neurologist who sent her directly to the emergency room. She called her doctor to request a prescription for Flexeril, which she has taken before but reports it's not particularly effective. Ms. Tena has been taking Tylenol and ibuprofen for pain management.    The MRI shows a complex tear of the anterior horn of the lateral meniscus, a large suprapatellar effusion with synovitis, some cartilage damage, and bone marrow edema.    Ms. Tena denies any specific accidents or injuries prior to the onset of knee pain, recent illnesses, or history of blood clots. She denies any previous surgeries on either knee.    PREVIOUS TREATMENTS:  - Ms. Tena went to the emergency room in late February 2025  - Given oral steroid " (MedDrol Dosepak) from the emergency room, which did not provide significant benefit  - Ms. Tena has been wearing a knee brace purchased from [employer] with brackets on the side, which provided some support  - Ms. 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  - Job involves maneuvering patients in and out of vans, requiring work in confined spaces  - [place] a pop in knee while maneuvering a patient in the van 2-3 months ago  - Returned to work after a week following the knee injury  - Reports difficulty performing job duties due to knee pain and swelling      ROS:  Musculoskeletal: +joint pain, +joint swelling, +joint stiffness, -back pain, +difficulty standing up          Past Medical History:   Diagnosis Date    Abnormal uterine bleeding (AUB)     Back pain     CHA (iron deficiency anemia)     Pelvic pain        Past Surgical History:   Procedure Laterality Date    DILATION AND CURETTAGE OF UTERUS USING SUCTION N/A 2/10/2021    Procedure: DILATION AND CURETTAGE, UTERUS, USING SUCTION;  Surgeon: Cristobal Song MD;  Location: [facility];  Service: OB/GYN;  Laterality: N/A;    HYSTEROSCOPY N/A 7/15/2019    Procedure: HYSTEROSCOPY;  Surgeon: Roseann Lauren MD;  Location: [facility];  Service: OB/GYN;  Laterality: N/A;    HYSTEROSCOPY WITH DILATION AND CURETTAGE OF UTERUS N/A 2/10/2021    Procedure: HYSTEROSCOPY, WITH DILATION AND CURETTAGE OF UTERUS;  Surgeon: Cristobal Song MD;  Location: [facility];  Service: OB/GYN;  Laterality: N/A;    SALPINGECTOMY Right 2/10/2021    Procedure: SALPINGECTOMY;  Surgeon: Cristobal Song MD;  Location: [facility];  Service: OB/GYN;  Laterality: Right;    SALPINGOOPHORECTOMY Left 2/10/2021    Procedure: SALPINGO-OOPHORECTOMY;  Surgeon: Cristobal Song  MD;  Location: [facility];  Service: OB/GYN;  Laterality: Left;  with Left Ovarian cystectomy    THERMAL ABLATION OF ENDOMETRIUM USING HYSTEROSCOPY N/A 7/15/2019    Procedure: ABLATION, ENDOMETRIUM, THERMAL, HYSTEROSCOPIC;  Surgeon: Roseann Lauren MD;  Location: [facility];  Service: OB/GYN;  Laterality: N/A;    TOTAL ABDOMINAL HYSTERECTOMY N/A 2/10/2021    Procedure: HYSTERECTOMY, TOTAL, ABDOMINAL;  Surgeon: Cristobal Song MD;  Location: [facility] OR;  Service: OB/GYN;  Laterality: N/A;  DONE  EMERGENCY     TUBAL LIGATION         Family History   Problem Relation Name Age of Onset    Lupus Mother      No Known Problems Father         Review of patient's allergies indicates:  No Known Allergies      Current Medications[1]      OBJECTIVE:     PHYSICAL EXAM:  Wt 88 kg (194 lb 0.1 oz)   LMP 06/04/2019   BMI 33.30 kg/m²   General: Pleasant, cooperative, NAD.  HEENT: NCAT, sclera nonicteric.  Lungs: Respirations are equal and unlabored.   Abdomen: Soft and non-tender.  CV: 2+ bilateral upper and lower extremity pulses.  Neuro: Sensation intact to light touch.  Skin: Intact throughout LE with no rashes, erythema, or lesions.  Extremities: No LE edema, NVI lower extremities. antalgic gait.    left Knee Exam:  Knee Range of Motion: 2-90   Effusion: significant  Condition of skin: intact. No signs of warmth/erythema  Location of tenderness: Medial joint line and Lateral joint line   Strength: 5/5 quadriceps strength, 5/5 gastroc-soleus strength, 5/5 hamstring strength, and 5/5 tibialis anterior strength  Stability: stable to testing  Knee Alignment:  Mild varus  Maynor: Positive Lateral    Noted varicosities to the lateral knee    right Knee Exam:  Knee Range of Motion: 0-120   Effusion: none  Condition of skin: intact  Location of tenderness: None   Strength: 5/5 quadriceps strength, 5/5 gastroc-soleus strength, 5/5 hamstring strength, and 5/5 tibialis anterior strength  Knee alignment: normal  Stability: stable to  testing  Maynor: negative    RADIOGRAPHS:  X-rays of the left knee taken previously were personally reviewed. Imaging reveals No acute fractures or dislocations. Mild medial compartment osteoarthritic changes.      ASSESSMENT:       ICD-10-CM ICD-9-CM   1. Acute lateral meniscus tear of left knee, initial encounter  S83.282A 836.1   2. Primary osteoarthritis of left knee  M17.12 715.16   3. Effusion of left knee  M25.462 719.06   4. Knee pain  M25.569 719.46   5. Prepatellar bursitis of left knee  M70.42 726.65       PLAN:     We discussed with the patient at length all the different treatment options available including anti-inflammatories, acetaminophen, rest, ice, knee strengthening exercise, occasional cortisone injections for temporary relief, Viscosupplimentation injections, arthroscopic debridement, osteotomy, and finally knee arthroplasty.     Assessment & Plan    - Right knee aspiration and steroid injection discussed and agreed upon with patient.  - Ice and elevate right knee above heart level.  - Use compressive sleeve on right knee for support.  - Continue tylenol and ibuprofen as needed.   - If symptoms persist or worsen, she may consider formal outpatient physical therapy or referral to sports medicine for consideration of left knee arthroscopy and possible meniscectomy.     Knee Arthrocentesis With Injection Procedure Note  Diagnosis: left knee osteoarthritis/lateral meniscus tear with effusion  Indications: Knee pain  Procedure Details: Verbal consent obtained and allergies reviewed. Area prepped with alcohol.  An 18 gauge needle was inserted into superolateral aspect of knee. 65 ml of clear yellow was removed from the joint and discarded.  The joint was then injected through the same needle with 1 mg of Kenalog and 2 ml 1% lidocaine. The needle was removed and the area was cleansed and dressed.    Complications: None.           This note was generated with the assistance of ambient listening  technology. Verbal consent was obtained by the patient and accompanying visitor(s) for the recording of patient appointment to facilitate this note. I attest to having reviewed and edited the generated note for accuracy, though some syntax or spelling errors may persist. Please contact the author of this note for any clarification.         Duy Hooks PA-C         [1]   Current Outpatient Medications:     HYDROcodone-acetaminophen (NORCO) 5-325 mg per tablet, Take 1 tablet by mouth every 8 (eight) hours as needed for Pain., Disp: 12 tablet, Rfl: 0    methylPREDNISolone (MEDROL DOSEPACK) 4 mg tablet, Take as directed, Disp: 1 each, Rfl: 0    pravastatin (PRAVACHOL) 40 MG tablet, Take 1 tablet by mouth once daily, Disp: 30 tablet, Rfl: 11

## 2025-04-11 ENCOUNTER — OFFICE VISIT (OUTPATIENT)
Dept: ORTHOPEDICS | Facility: CLINIC | Age: 55
End: 2025-04-11

## 2025-04-11 VITALS — HEART RATE: 70 BPM | SYSTOLIC BLOOD PRESSURE: 156 MMHG | DIASTOLIC BLOOD PRESSURE: 74 MMHG

## 2025-04-11 DIAGNOSIS — S83.282A ACUTE LATERAL MENISCUS TEAR OF LEFT KNEE, INITIAL ENCOUNTER: Primary | ICD-10-CM

## 2025-04-11 DIAGNOSIS — M17.12 PRIMARY OSTEOARTHRITIS OF LEFT KNEE: ICD-10-CM

## 2025-04-11 PROCEDURE — 99213 OFFICE O/P EST LOW 20 MIN: CPT | Mod: PBBFAC

## 2025-04-11 PROCEDURE — 99999 PR PBB SHADOW E&M-EST. PATIENT-LVL III: CPT | Mod: PBBFAC,,,

## 2025-04-11 RX ORDER — MELOXICAM 15 MG/1
15 TABLET ORAL DAILY
Qty: 30 TABLET | Refills: 0 | Status: SHIPPED | OUTPATIENT
Start: 2025-04-11

## 2025-04-11 NOTE — PROGRESS NOTES
SUBJECTIVE:     Chief Complaint & History of Present Illness:  History of Present Illness    CHIEF COMPLAINT:  - Knee pain and swelling    HPI:  Ms. Tena presents for follow-up of knee issues, reporting some improvement but ongoing symptoms. Three weeks ago, she had fluid drained from her knee and received a steroid injection. Initially, there was no improvement for the first couple of days, but then she experienced some relief. However, she reports that symptoms are returning.    Her main symptom is tightness rather than pain in her knee. Yesterday, she noticed popping in the knee. Swelling persists, though not as severe as when the fluid was initially drained. The swelling extends throughout the whole leg, with one leg appearing slightly larger than the other. She acknowledges improvement, stating that the condition is significantly better than before.    She reports being able to work and perform most daily activities. When asked about instability, she does not engage in activities that might provoke such a feeling.    Previous imaging from February showed arthritis on the inside of the knee, and an MRI revealed a lateral meniscus tear and bone bruising on the outside of the knee.    She denies feelings of instability in the knee or severe pain.    PREVIOUS TREATMENTS:  - Fluid aspiration from the knee: Performed 3 weeks ago, provided minimal relief initially but some improvement after a few days.  - Steroid injection: Administered along with the fluid aspiration, provided some benefit.  - Conservative management: She has been limiting activities that might cause instability.    WORK STATUS:  - She is able to work and perform most daily activities.      ROS:  Cardiovascular: +lower extremity edema  Musculoskeletal: +joint swelling, +limb swelling          Past Medical History:   Diagnosis Date    Abnormal uterine bleeding (AUB)     Back pain     CHA (iron deficiency anemia)     Pelvic pain        Past  Surgical History:   Procedure Laterality Date    DILATION AND CURETTAGE OF UTERUS USING SUCTION N/A 2/10/2021    Procedure: DILATION AND CURETTAGE, UTERUS, USING SUCTION;  Surgeon: Cristobal Song MD;  Location: [facility];  Service: OB/GYN;  Laterality: N/A;    HYSTEROSCOPY N/A 7/15/2019    Procedure: HYSTEROSCOPY;  Surgeon: Roseann Lauren MD;  Location: [facility];  Service: OB/GYN;  Laterality: N/A;    HYSTEROSCOPY WITH DILATION AND CURETTAGE OF UTERUS N/A 2/10/2021    Procedure: HYSTEROSCOPY, WITH DILATION AND CURETTAGE OF UTERUS;  Surgeon: Cristobal Song MD;  Location: [facility];  Service: OB/GYN;  Laterality: N/A;    SALPINGECTOMY Right 2/10/2021    Procedure: SALPINGECTOMY;  Surgeon: Cristobal Song MD;  Location: [facility];  Service: OB/GYN;  Laterality: Right;    SALPINGOOPHORECTOMY Left 2/10/2021    Procedure: SALPINGO-OOPHORECTOMY;  Surgeon: Cristobal Song MD;  Location: [facility];  Service: OB/GYN;  Laterality: Left;  with Left Ovarian cystectomy    THERMAL ABLATION OF ENDOMETRIUM USING HYSTEROSCOPY N/A 7/15/2019    Procedure: ABLATION, ENDOMETRIUM, THERMAL, HYSTEROSCOPIC;  Surgeon: Roseann Lauren MD;  Location: [facility];  Service: OB/GYN;  Laterality: N/A;    TOTAL ABDOMINAL HYSTERECTOMY N/A 2/10/2021    Procedure: HYSTERECTOMY, TOTAL, ABDOMINAL;  Surgeon: Cristobal Song MD;  Location: [facility];  Service: OB/GYN;  Laterality: N/A;  DONE  EMERGENCY     TUBAL LIGATION         Family History   Problem Relation Name Age of Onset    Lupus Mother      No Known Problems Father         Review of patient's allergies indicates:  No Known Allergies      Current Medications[1]      OBJECTIVE:     PHYSICAL EXAM:  BP (!) 156/74 (BP Location: Right arm)   Pulse 70   LMP 06/04/2019   General: Pleasant, cooperative, NAD.  HEENT: NCAT, sclera nonicteric.  Lungs: Respirations are equal and unlabored.   Abdomen: Soft and non-tender.  CV: 2+ bilateral upper and lower extremity pulses.  Neuro: Sensation intact to light touch.  Skin:  Intact throughout LE with no rashes, erythema, or lesions.  Extremities: No LE edema, NVI lower extremities. normal gait.    left Knee Exam:  Knee Range of Motion: 0-120   Effusion: not significant  Condition of skin: intact  Location of tenderness: Lateral joint line   Strength: 5/5 quadriceps strength, 5/5 gastroc-soleus strength, 5/5 hamstring strength, and 5/5 tibialis anterior strength  Stability: stable to testing  Knee Alignment:  Mild varus  Maynor: Positive Lateral/Positive Medial    RADIOGRAPHS:  None today      ASSESSMENT:       ICD-10-CM ICD-9-CM   1. Acute lateral meniscus tear of left knee, initial encounter  S83.282A 836.1   2. Primary osteoarthritis of left knee  M17.12 715.16       PLAN:     We discussed with the patient at length all the different treatment options available including anti-inflammatories, acetaminophen, rest, ice, knee strengthening exercise, occasional cortisone injections for temporary relief, Viscosupplimentation injections, arthroscopic debridement, osteotomy, and finally knee arthroplasty.     Assessment & Plan    - Much improved after aspiration/injection on 3/18  - Discussed potential arthroscopic surgery to clean up the lateral meniscus tear.  - May provide temporary relief, but could accelerate arthritic changes in the knee.  - Considered repeating steroid injection (can be done every 3 months).  - Discussed possibility of aspirating joint fluid if needed to improve mobility.  - Continue ice, elevation, and compression.   - Begin Meloxicam  - She may follow up as needed for new or worsening symptoms.          This note was generated with the assistance of ambient listening technology. Verbal consent was obtained by the patient and accompanying visitor(s) for the recording of patient appointment to facilitate this note. I attest to having reviewed and edited the generated note for accuracy, though some syntax or spelling errors may persist. Please contact the author of  this note for any clarification.         Duy Hooks PA-C         [1]   Current Outpatient Medications:     HYDROcodone-acetaminophen (NORCO) 5-325 mg per tablet, Take 1 tablet by mouth every 8 (eight) hours as needed for Pain., Disp: 12 tablet, Rfl: 0    methylPREDNISolone (MEDROL DOSEPACK) 4 mg tablet, Take as directed, Disp: 1 each, Rfl: 0    pravastatin (PRAVACHOL) 40 MG tablet, Take 1 tablet by mouth once daily, Disp: 30 tablet, Rfl: 11

## 2025-05-27 ENCOUNTER — OFFICE VISIT (OUTPATIENT)
Dept: CARDIOLOGY | Facility: CLINIC | Age: 55
End: 2025-05-27

## 2025-05-27 ENCOUNTER — LAB VISIT (OUTPATIENT)
Dept: LAB | Facility: HOSPITAL | Age: 55
End: 2025-05-27
Attending: INTERNAL MEDICINE

## 2025-05-27 VITALS
HEART RATE: 79 BPM | BODY MASS INDEX: 33.12 KG/M2 | OXYGEN SATURATION: 99 % | WEIGHT: 194 LBS | SYSTOLIC BLOOD PRESSURE: 159 MMHG | DIASTOLIC BLOOD PRESSURE: 87 MMHG | HEIGHT: 64 IN

## 2025-05-27 DIAGNOSIS — I83.899 VARICOSE VEINS WITH SWELLING: ICD-10-CM

## 2025-05-27 DIAGNOSIS — M25.562 PAIN OF LEFT KNEE AND LOWER LEG: ICD-10-CM

## 2025-05-27 DIAGNOSIS — M79.662 PAIN OF LEFT KNEE AND LOWER LEG: ICD-10-CM

## 2025-05-27 DIAGNOSIS — R03.0 ELEVATED BLOOD PRESSURE READING: ICD-10-CM

## 2025-05-27 DIAGNOSIS — I87.2 VENOUS INSUFFICIENCY OF BOTH LOWER EXTREMITIES: ICD-10-CM

## 2025-05-27 DIAGNOSIS — I83.813 VARICOSE VEINS OF BILATERAL LOWER EXTREMITIES WITH PAIN: ICD-10-CM

## 2025-05-27 DIAGNOSIS — R60.0 EDEMA OF BOTH LOWER EXTREMITIES: Primary | ICD-10-CM

## 2025-05-27 DIAGNOSIS — R60.0 EDEMA OF BOTH LOWER EXTREMITIES: ICD-10-CM

## 2025-05-27 DIAGNOSIS — I10 ESSENTIAL HYPERTENSION: ICD-10-CM

## 2025-05-27 LAB
ALBUMIN SERPL BCP-MCNC: 3.7 G/DL (ref 3.5–5.2)
ALP SERPL-CCNC: 88 UNIT/L (ref 40–150)
ALT SERPL W/O P-5'-P-CCNC: 13 UNIT/L (ref 10–44)
ANION GAP (OHS): 11 MMOL/L (ref 8–16)
AST SERPL-CCNC: 17 UNIT/L (ref 11–45)
BILIRUB SERPL-MCNC: 0.3 MG/DL (ref 0.1–1)
BUN SERPL-MCNC: 12 MG/DL (ref 6–20)
CALCIUM SERPL-MCNC: 9.3 MG/DL (ref 8.7–10.5)
CHLORIDE SERPL-SCNC: 109 MMOL/L (ref 95–110)
CO2 SERPL-SCNC: 25 MMOL/L (ref 23–29)
CREAT SERPL-MCNC: 0.8 MG/DL (ref 0.5–1.4)
GFR SERPLBLD CREATININE-BSD FMLA CKD-EPI: >60 ML/MIN/1.73/M2
GLUCOSE SERPL-MCNC: 85 MG/DL (ref 70–110)
POTASSIUM SERPL-SCNC: 4.5 MMOL/L (ref 3.5–5.1)
PROT SERPL-MCNC: 7.8 GM/DL (ref 6–8.4)
SODIUM SERPL-SCNC: 145 MMOL/L (ref 136–145)

## 2025-05-27 PROCEDURE — 99215 OFFICE O/P EST HI 40 MIN: CPT | Mod: S$PBB,,, | Performed by: INTERNAL MEDICINE

## 2025-05-27 PROCEDURE — 99213 OFFICE O/P EST LOW 20 MIN: CPT | Mod: PBBFAC | Performed by: INTERNAL MEDICINE

## 2025-05-27 PROCEDURE — 36415 COLL VENOUS BLD VENIPUNCTURE: CPT

## 2025-05-27 PROCEDURE — 99999 PR PBB SHADOW E&M-EST. PATIENT-LVL III: CPT | Mod: PBBFAC,,, | Performed by: INTERNAL MEDICINE

## 2025-05-27 PROCEDURE — 84450 TRANSFERASE (AST) (SGOT): CPT

## 2025-05-27 RX ORDER — LOSARTAN POTASSIUM 25 MG/1
25 TABLET ORAL DAILY
Qty: 30 TABLET | Refills: 11 | Status: SHIPPED | OUTPATIENT
Start: 2025-05-27 | End: 2026-05-27

## 2025-05-27 NOTE — PATIENT INSTRUCTIONS
Assessment/Plan:  Yoni Tena is a 54 y.o. female with HTN, former smoker, obesity, who presents for an initial appointment.     Leg Pain- MRI left knee on 3/3/2025 showed a complex tear of the anterior horn of the lateral meniscus, a large suprapatellar effusion with synovitis, some cartilage damage, and bone marrow edema. Continue management per by Ortho.    2. Varicose veins with leg swelling- Due to venous insufficiency. Edema is improving. ASHLEY Study on 2/27/2025 revealed normal resting and exercise ABIs bilaterally. Venous Reflux Study on 2/27/2025 revealed hemodynamically significant BLE venous reflux and no DVT. Recommend wearing graduated compression hose.  Limit sodium intake to 2000 mg daily.  Limit volume intake to 1.5 L daily.  Elevate legs when resting.    3. HTN- Start losartan 25 mg daily. Check cmp today and in 1 week. Pt to keep log of blood pressure/heart rate and bring in next visit for review.     4. Obesity- Encourage diet, exercise, and weight loss.    Pt to send in blood pressures in 1 month  Follow up in 2 months

## 2025-05-27 NOTE — PROGRESS NOTES
[facility]      Chief Complaint   Patient presents with    Pain in both lower extremities       Patient ID: Yoni Tena is a 54 y.o. female with HTN, former smoker, obesity, who presents for an initial appointment. Pertinent history events are as follows:     -Pt kindly referred by Nick Kirkland NP for evaluation of varicose veins.    -At our initial clinic visit on 2/25/2025, Mrs. Tena reported pain and swelling in both legs (L>R) which started 2 years ago. States pain and swelling in both legs is constant. Symptoms are improved with Tylenol and fluid pills. Smokes half pack a day for 10 years. Quit 1 month ago. Works in transportation. BLE Arterial Ultrasound on 11/25/2024 revealed atherosclerotic disease throughout both lower extremity arteries with no sonographic evidence to suggest focal hemodynamically significant stenosis.  Plan:  Leg Pain- Pt with bilateral leg pain (L>R) Left knee with fluctuant area of moderate edema and warmth with severe TTP.  Pain is out of proportion to exam. Etiology is unclear. Possibilities include gout, septic joint, and structural abnormalities, Knee X-ray on 2/18/2025 is negative. BLE Arterial Ultrasound on 11/25/2024 revealed atherosclerotic disease throughout both lower extremity arteries with no sonographic evidence to suggest focal hemodynamically significant stenosis. Given severe pain out of proportion to exam resulting in pt's inability to walk, I recommend left knee and immediate Ortho evaluation. Pt will be transported to ED for expedited workup as outlined.   Varicose veins with leg swelling- Check BLE venous reflux study and ASHLEY study. Recommend wearing graduated compression hose.  Limit sodium intake to 2000 mg daily.  Limit volume intake to 1.5 L daily.  Elevate legs when resting.  Obesity- Encourage diet, exercise, and weight loss.    HPI:  Mrs. Tena reports improvement in pain and swelling of left knee. MRI left knee on  3/3/2025 showed a complex tear of the anterior horn of the lateral meniscus, a large suprapatellar effusion with synovitis, some cartilage damage, and bone marrow edema. This is being managed by Ortho. ASHLEY Study on 2/27/2025 revealed normal resting and exercise ABIs bilaterally. Venous Reflux Study on 2/27/2025 revealed hemodynamically significant BLE venous reflux and no DVT. Blood pressure today is 159/87 with pulse 79 bpm.     Past Medical History:   Diagnosis Date    Abnormal uterine bleeding (AUB)     Back pain     CHA (iron deficiency anemia)     Pelvic pain      Past Surgical History:   Procedure Laterality Date    DILATION AND CURETTAGE OF UTERUS USING SUCTION N/A 2/10/2021    Procedure: DILATION AND CURETTAGE, UTERUS, USING SUCTION;  Surgeon: Cristobal Song MD;  Location: [facility];  Service: OB/GYN;  Laterality: N/A;    HYSTEROSCOPY N/A 7/15/2019    Procedure: HYSTEROSCOPY;  Surgeon: Roseann Lauren MD;  Location: [facility] OR;  Service: OB/GYN;  Laterality: N/A;    HYSTEROSCOPY WITH DILATION AND CURETTAGE OF UTERUS N/A 2/10/2021    Procedure: HYSTEROSCOPY, WITH DILATION AND CURETTAGE OF UTERUS;  Surgeon: Cristobal Song MD;  Location: [facility];  Service: OB/GYN;  Laterality: N/A;    SALPINGECTOMY Right 2/10/2021    Procedure: SALPINGECTOMY;  Surgeon: Cristobal Song MD;  Location: [facility];  Service: OB/GYN;  Laterality: Right;    SALPINGOOPHORECTOMY Left 2/10/2021    Procedure: SALPINGO-OOPHORECTOMY;  Surgeon: Cristobal Song MD;  Location: [facility] OR;  Service: OB/GYN;  Laterality: Left;  with Left Ovarian cystectomy    THERMAL ABLATION OF ENDOMETRIUM USING HYSTEROSCOPY N/A 7/15/2019    Procedure: ABLATION, ENDOMETRIUM, THERMAL, HYSTEROSCOPIC;  Surgeon: Roseann Lauren MD;  Location: [facility] OR;  Service: OB/GYN;  Laterality: N/A;    TOTAL ABDOMINAL HYSTERECTOMY N/A 2/10/2021    Procedure: HYSTERECTOMY, TOTAL, ABDOMINAL;  Surgeon: Cristobal Song MD;  Location: [facility] OR;  Service: OB/GYN;  Laterality: N/A;  DONE  EMERGENCY   "   TUBAL LIGATION       Social History[1]  Family History   Problem Relation Name Age of Onset    Lupus Mother      No Known Problems Father         Review of patient's allergies indicates:  No Known Allergies    Medication List with Changes/Refills   Current Medications    HYDROCODONE-ACETAMINOPHEN (NORCO) 5-325 MG PER TABLET    Take 1 tablet by mouth every 8 (eight) hours as needed for Pain.    MELOXICAM (MOBIC) 15 MG TABLET    Take 1 tablet (15 mg total) by mouth once daily.    METHYLPREDNISOLONE (MEDROL DOSEPACK) 4 MG TABLET    Take as directed    PRAVASTATIN (PRAVACHOL) 40 MG TABLET    Take 1 tablet by mouth once daily       Review of Systems  Constitution: Denies chills, fever, and sweats.  HENT: Denies headaches or blurry vision.  Cardiovascular: Denies chest pain or irregular heart beat.  Respiratory: Denies cough or shortness of breath.  Gastrointestinal: Denies abdominal pain, nausea, or vomiting.  Musculoskeletal: Denies muscle cramps.  Neurological: Denies dizziness or focal weakness.  Psychiatric/Behavioral: Normal mental status.  Hematologic/Lymphatic: Denies bleeding problem or easy bruising/bleeding.  Skin: Denies rash or suspicious lesions    Physical Examination  BP (!) 159/87   Pulse 79   Ht 5' 4" (1.626 m)   Wt 88 kg (194 lb 0.1 oz)   LMP 06/04/2019   SpO2 99%   BMI 33.30 kg/m²     Constitutional: No acute distress, conversant  HEENT: Sclera anicteric, Pupils equal, round and reactive to light, extraocular motions intact, Oropharynx clear  Neck: No JVD, no carotid bruits  Cardiovascular: regular rate and rhythm, no murmur, rubs or gallops, normal S1/S2  Pulmonary: Clear to auscultation bilaterally  Abdominal: Abdomen soft, nontender, nondistended, positive bowel sounds  Extremities: BLE's with trace pitting edema and prominent varicose veins  Left knee with fluctuant area of moderate edema and warmth with severe TTP  Right knee with TTP and mild edema   Pulses:  Carotid pulses are 2+ on the " right side, and 2+ on the left side.  Radial pulses are 2+ on the right side, and 2+ on the left side.   Femoral pulses are 2+ on the right side, and 2+ on the left side.  Popliteal pulses are 2+ on the right side, and 2+ on the left side.   Dorsalis pedis pulses are 2+ on the right side, and 2+ on the left side.   Posterior tibial pulses are 2+ on the right side, and 2+ on the left side.    Skin: No ecchymosis, erythema, or ulcers  Psych: Alert and oriented x 3, appropriate affect  Neuro: CNII-XII intact, no focal deficits    Labs:  Most Recent Data  CBC:   Lab Results   Component Value Date    WBC 4.27 02/25/2025    HGB 12.9 02/25/2025    HCT 40.1 02/25/2025     02/25/2025    MCV 88 02/25/2025    RDW 13.8 02/25/2025     BMP:   Lab Results   Component Value Date     02/25/2025    K 3.9 02/25/2025     02/25/2025    CO2 25 02/25/2025    BUN 17 02/25/2025    CREATININE 0.7 02/25/2025    GLU 80 02/25/2025    CALCIUM 9.7 02/25/2025    MG 1.9 04/13/2023    PHOS 3.5 04/13/2023     LFTS;   Lab Results   Component Value Date    PROT 7.9 02/25/2025    ALBUMIN 3.9 02/25/2025    BILITOT 0.3 02/25/2025    AST 23 02/25/2025    ALKPHOS 81 02/25/2025    ALT 12 02/25/2025     COAGS:   Lab Results   Component Value Date    INR 1.0 04/13/2023     FLP:   Lab Results   Component Value Date    CHOL 131 08/30/2024    HDL 39 (L) 08/30/2024    LDLCALC 79.6 08/30/2024    TRIG 62 08/30/2024    CHOLHDL 29.8 08/30/2024     CARDIAC:   Lab Results   Component Value Date    TROPONINI <0.012 04/28/2022    BNP <10 10/14/2020       Imaging:    EKG 7/18/2023:  Normal sinus rhythm     ASHLEY Study 2/27/2025:    Normal resting and exercise ABIs bilaterally.    PVR waveforms are mildly to moderately dampened at the low thigh and ankle level bilaterally.     Venous Reflux Study 2/27/2025:    There is no evidence of a right lower extremity DVT.    The right common femoral vein has reflux.    The right greater saphenous vein has reflux.     There is no evidence of a left lower extremity DVT.    The left greater saphenous vein has reflux.    BLE Arterial Ultrasound 11/25/2024:  Atherosclerotic disease throughout both lower extremity arteries with no sonographic evidence to suggest focal hemodynamically significant stenosis.     Assessment/Plan:  Yoni Tena is a 54 y.o. female with HTN, former smoker, obesity, who presents for an initial appointment.     Leg Pain- MRI left knee on 3/3/2025 showed a complex tear of the anterior horn of the lateral meniscus, a large suprapatellar effusion with synovitis, some cartilage damage, and bone marrow edema. Continue management per by Ortho.    2. Varicose veins with leg swelling- Due to venous insufficiency. Edema is improving. ASHLEY Study on 2/27/2025 revealed normal resting and exercise ABIs bilaterally. Venous Reflux Study on 2/27/2025 revealed hemodynamically significant BLE venous reflux and no DVT. Recommend wearing graduated compression hose.  Limit sodium intake to 2000 mg daily.  Limit volume intake to 1.5 L daily.  Elevate legs when resting.    3. HTN- Start losartan 25 mg daily. Check cmp today and in 1 week. Pt to keep log of blood pressure/heart rate and bring in next visit for review.     4. Obesity- Encourage diet, exercise, and weight loss.    Pt to send in blood pressures in 1 month  Follow up in 2 months    Total duration of face to face visit time 25 minutes.  Total time spent counseling greater than fifty percent of total visit time.  Counseling included discussion regarding imaging findings, diagnosis, possibilities, treatment options, risks and benefits.  The patient had many questions regarding the options and long-term effects.    Abbe Cleary MD, PhD  Interventional Cardiology           [1]   Social History  Socioeconomic History    Marital status: Single   Tobacco Use    Smoking status: Former     Current packs/day: 0.00     Types: Cigarettes     Quit date: 4/28/2023      Years since quittin.0    Smokeless tobacco: Never    Tobacco comments:     3 cigarettes per day   Substance and Sexual Activity    Alcohol use: Yes     Comment: occasionally    Drug use: No    Sexual activity: Not Currently     Partners: Male     Birth control/protection: See Surgical Hx     Comment: with one partner for 7 years   Social History Narrative    ** Merged History Encounter **

## 2025-05-30 ENCOUNTER — OFFICE VISIT (OUTPATIENT)
Dept: ORTHOPEDICS | Facility: CLINIC | Age: 55
End: 2025-05-30

## 2025-05-30 ENCOUNTER — HOSPITAL ENCOUNTER (OUTPATIENT)
Dept: RADIOLOGY | Facility: HOSPITAL | Age: 55
Discharge: HOME | End: 2025-05-30

## 2025-05-30 VITALS — BODY MASS INDEX: 33.12 KG/M2 | HEIGHT: 64 IN | WEIGHT: 194 LBS

## 2025-05-30 DIAGNOSIS — S83.282D ACUTE LATERAL MENISCUS TEAR OF LEFT KNEE, SUBSEQUENT ENCOUNTER: Primary | ICD-10-CM

## 2025-05-30 DIAGNOSIS — M25.562 CHRONIC PAIN OF LEFT KNEE: ICD-10-CM

## 2025-05-30 DIAGNOSIS — M25.462 EFFUSION OF LEFT KNEE: ICD-10-CM

## 2025-05-30 DIAGNOSIS — G89.29 CHRONIC PAIN OF LEFT KNEE: ICD-10-CM

## 2025-05-30 LAB
APPEARANCE FLD: CLEAR
COLOR FLD: YELLOW
CRYSTAL, BODY FLUID (OHS): NORMAL
GRAM STN SPEC: NORMAL
GRAM STN SPEC: NORMAL
LYMPHOCYTES NFR FLD MANUAL: 25 %
MONOS+MACROS NFR FLD MANUAL: 55 %
NEUTROPHILS NFR FLD MANUAL: 20 %
PATHOLOGIST REVIEW - CRYSTALS BF (OHS): NORMAL
WBC # FLD: 381 /CU MM

## 2025-05-30 PROCEDURE — 89060 EXAM SYNOVIAL FLUID CRYSTALS: CPT

## 2025-05-30 PROCEDURE — 99999 PR PBB SHADOW E&M-EST. PATIENT-LVL IV: CPT | Mod: PBBFAC,,,

## 2025-05-30 PROCEDURE — 73562 X-RAY EXAM OF KNEE 3: CPT | Mod: TC,LT

## 2025-05-30 PROCEDURE — 89051 BODY FLUID CELL COUNT: CPT

## 2025-05-30 PROCEDURE — 73562 X-RAY EXAM OF KNEE 3: CPT | Mod: 26,LT,, | Performed by: RADIOLOGY

## 2025-05-30 PROCEDURE — 99214 OFFICE O/P EST MOD 30 MIN: CPT | Mod: PBBFAC,25

## 2025-05-30 PROCEDURE — 87205 SMEAR GRAM STAIN: CPT

## 2025-05-30 PROCEDURE — 87070 CULTURE OTHR SPECIMN AEROBIC: CPT

## 2025-05-30 PROCEDURE — 73560 X-RAY EXAM OF KNEE 1 OR 2: CPT | Mod: 26,59,RT, | Performed by: RADIOLOGY

## 2025-05-30 NOTE — PROGRESS NOTES
SUBJECTIVE:     Chief Complaint & History of Present Illness:  History of Present Illness    CHIEF COMPLAINT:  - Left knee pain and swelling    HPI:  Ms. Tena presents for follow-up of a left knee issue. Initially seen in February, an MRI revealed a lateral meniscus tear. At a subsequent visit, fluid was aspirated and a steroid injection administered. Two weeks later, range of motion had improved significantly. Since then, the knee has been retaining fluid similar to the first visit. No new accidents or injuries have occurred.    Her primary complaint is left knee pain, described as tight and swollen again. Pain is most severe on the lateral aspect, with discomfort throughout the knee. She expresses concern about recurring fluid buildup.    Treatment history includes initial evaluation, emergency room visit, fluid aspiration, steroid injection, and follow-up visits. Recent prescription of Losartan for blood pressure management and a brief trial of meloxicam, discontinued due to side effects without completing the full course.    She denies any issues with the right knee although she states she does have diffuse tenderness about the right knee as well.     She reports her mother had history of Lupus and  at the age of 37. She questions if rheumatologic workup would be warranted.     PREVIOUS TREATMENTS:  - Fluid aspiration: Performed during a previous visit  - Steroid injection: Administered during a previous visit, provided relief with improved motion after two weeks    MEDICATIONS:  - Losartan: Recently prescribed for blood pressure  - Meloxicam: Briefly trialed, discontinued due to side effects      ROS:  Cardiovascular: +cold extremities  Musculoskeletal: +joint pain, +joint swelling, +limb pain, +pain with movement          Past Medical History:   Diagnosis Date    Abnormal uterine bleeding (AUB)     Back pain     CHA (iron deficiency anemia)     Pelvic pain        Past Surgical History:   Procedure  "Laterality Date    DILATION AND CURETTAGE OF UTERUS USING SUCTION N/A 2/10/2021    Procedure: DILATION AND CURETTAGE, UTERUS, USING SUCTION;  Surgeon: Cristobal Song MD;  Location: [facility] OR;  Service: OB/GYN;  Laterality: N/A;    HYSTEROSCOPY N/A 7/15/2019    Procedure: HYSTEROSCOPY;  Surgeon: Roseann Lauren MD;  Location: [facility] OR;  Service: OB/GYN;  Laterality: N/A;    HYSTEROSCOPY WITH DILATION AND CURETTAGE OF UTERUS N/A 2/10/2021    Procedure: HYSTEROSCOPY, WITH DILATION AND CURETTAGE OF UTERUS;  Surgeon: Cristobal Song MD;  Location: [facility] OR;  Service: OB/GYN;  Laterality: N/A;    SALPINGECTOMY Right 2/10/2021    Procedure: SALPINGECTOMY;  Surgeon: Cristobal Song MD;  Location: [facility];  Service: OB/GYN;  Laterality: Right;    SALPINGOOPHORECTOMY Left 2/10/2021    Procedure: SALPINGO-OOPHORECTOMY;  Surgeon: Cristobal Song MD;  Location: [facility];  Service: OB/GYN;  Laterality: Left;  with Left Ovarian cystectomy    THERMAL ABLATION OF ENDOMETRIUM USING HYSTEROSCOPY N/A 7/15/2019    Procedure: ABLATION, ENDOMETRIUM, THERMAL, HYSTEROSCOPIC;  Surgeon: Roseann Lauren MD;  Location: [facility];  Service: OB/GYN;  Laterality: N/A;    TOTAL ABDOMINAL HYSTERECTOMY N/A 2/10/2021    Procedure: HYSTERECTOMY, TOTAL, ABDOMINAL;  Surgeon: Cristobal Song MD;  Location: [facility];  Service: OB/GYN;  Laterality: N/A;  DONE  EMERGENCY     TUBAL LIGATION         Family History   Problem Relation Name Age of Onset    Lupus Mother      No Known Problems Father         Review of patient's allergies indicates:  No Known Allergies      Current Medications[1]      OBJECTIVE:     PHYSICAL EXAM:  Ht 5' 4" (1.626 m)   Wt 88 kg (194 lb 0.1 oz)   LMP 06/04/2019   BMI 33.30 kg/m²   General: Pleasant, cooperative, NAD.  HEENT: NCAT, sclera nonicteric.  Lungs: Respirations are equal and unlabored.   Abdomen: Soft and non-tender.  CV: 2+ bilateral upper and lower extremity pulses.  Neuro: Sensation intact to light touch.  Skin: Intact throughout " LE with no rashes, erythema, or lesions.  Extremities: No LE edema, NVI lower extremities. antalgic gait.    left Knee Exam:  Knee Range of Motion: 2-95   Effusion: Moderate  Condition of skin: intact, varicosities again noted to lateral left knee  Location of tenderness: Diffuse, lateral joint line    Strength: 5/5 quadriceps strength, 5/5 gastroc-soleus strength, 5/5 hamstring strength, and 5/5 tibialis anterior strength  Stability: stable to testing  Knee Alignment: normal  Maynor: Positive     right Knee Exam:  Knee Range of Motion: 0-120   Effusion: none  Condition of skin: intact  Location of tenderness: Diffuse   Strength: 5/5 quadriceps strength, 5/5 gastroc-soleus strength, 5/5 hamstring strength, and 5/5 tibialis anterior strength  Knee alignment: normal  Stability: stable to testing  Maynor: negative    RADIOGRAPHS:  X-rays of the left knee taken today personally reviewed. Imaging reveals no acute fractures or dislocations. Mild tricompartmental osteoarthritic changes with overall satisfactory preservation of joint spacing.      ASSESSMENT:       ICD-10-CM ICD-9-CM   1. Acute lateral meniscus tear of left knee, subsequent encounter  S83.282D V58.89     836.1   2. Effusion of left knee  M25.462 719.06   3. Chronic pain of left knee  M25.562 719.46    G89.29 338.29       PLAN:     We discussed with the patient at length all the different treatment options available including anti-inflammatories, acetaminophen, rest, ice, knee strengthening exercise, occasional cortisone injections for temporary relief, Viscosupplimentation injections, arthroscopic debridement, osteotomy, and finally knee arthroplasty.     - L knee ROM improved from 2-95 to 0-120 after aspiration with noted improvements in her symptoms.   - Suspicion low for septic joint, no signs of warmth/erythema. Fluid aspiration clear yellow.   - Will begin infectious workup given recurrent effusions.     Assessment & Plan    - Aspirated fluid from  the left knee for diagnostic purposes.  - Sent fluid for laboratory analysis including cell count, cultures, and crystal analysis.  - CBC, ESR, CRP ordered  - Ms. Tena understands the risks and benefits of arthroscopic surgery to clean out the meniscus, including possible advancement of arthritis.  - Referred to sports medicine for evaluation of potential arthroscopic surgery.  - Follow up after lab results are available.     - Given her concern for mother's history of SLE and pain out of proportion to exam, I will place rheumatology referral.       Knee Arthrocentesis Procedure Note  Diagnosis: left knee osteoarthritis with effusion  Indications: Knee pain  Procedure Details: Verbal consent obtained and allergies reviewed. Area prepped with alcohol.  An 18 gauge needle was inserted into superolateral aspect of knee. 50 ml of clear yellow was removed from the joint and sent to the lab for analysis. The needle was removed and the area was cleansed and dressed.    Complications: None.           This note was generated with the assistance of ambient listening technology. Verbal consent was obtained by the patient and accompanying visitor(s) for the recording of patient appointment to facilitate this note. I attest to having reviewed and edited the generated note for accuracy, though some syntax or spelling errors may persist. Please contact the author of this note for any clarification.         Duy Hooks PA-C         [1]   Current Outpatient Medications:     HYDROcodone-acetaminophen (NORCO) 5-325 mg per tablet, Take 1 tablet by mouth every 8 (eight) hours as needed for Pain., Disp: 12 tablet, Rfl: 0    losartan (COZAAR) 25 MG tablet, Take 1 tablet (25 mg total) by mouth once daily., Disp: 30 tablet, Rfl: 11    meloxicam (MOBIC) 15 MG tablet, Take 1 tablet (15 mg total) by mouth once daily., Disp: 30 tablet, Rfl: 0    methylPREDNISolone (MEDROL DOSEPACK) 4 mg tablet, Take as directed, Disp: 1 each, Rfl:  0    pravastatin (PRAVACHOL) 40 MG tablet, Take 1 tablet by mouth once daily, Disp: 30 tablet, Rfl: 11

## 2025-05-31 LAB
BACTERIA FLD AEROBE CULT: NORMAL
GRAM STN SPEC: NORMAL
GRAM STN SPEC: NORMAL

## 2025-06-03 DIAGNOSIS — I10 ESSENTIAL HYPERTENSION: Primary | ICD-10-CM

## 2025-06-06 ENCOUNTER — TELEPHONE (OUTPATIENT)
Dept: SPORTS MEDICINE | Facility: CLINIC | Age: 55
End: 2025-06-06

## 2025-06-10 ENCOUNTER — OFFICE VISIT (OUTPATIENT)
Dept: SPORTS MEDICINE | Facility: CLINIC | Age: 55
End: 2025-06-10

## 2025-06-10 VITALS
BODY MASS INDEX: 33.24 KG/M2 | HEIGHT: 64 IN | DIASTOLIC BLOOD PRESSURE: 88 MMHG | WEIGHT: 194.69 LBS | HEART RATE: 75 BPM | SYSTOLIC BLOOD PRESSURE: 129 MMHG

## 2025-06-10 DIAGNOSIS — M23.201 OLD COMPLEX TEAR OF LATERAL MENISCUS OF LEFT KNEE: ICD-10-CM

## 2025-06-10 DIAGNOSIS — M25.462 KNEE EFFUSION, LEFT: ICD-10-CM

## 2025-06-10 DIAGNOSIS — M25.562 CHRONIC PAIN OF LEFT KNEE: Primary | ICD-10-CM

## 2025-06-10 DIAGNOSIS — G89.29 CHRONIC PAIN OF LEFT KNEE: Primary | ICD-10-CM

## 2025-06-10 PROCEDURE — 99213 OFFICE O/P EST LOW 20 MIN: CPT | Mod: PBBFAC | Performed by: PHYSICIAN ASSISTANT

## 2025-06-10 PROCEDURE — 99999 PR PBB SHADOW E&M-EST. PATIENT-LVL III: CPT | Mod: PBBFAC,,, | Performed by: PHYSICIAN ASSISTANT

## 2025-06-10 PROCEDURE — 99204 OFFICE O/P NEW MOD 45 MIN: CPT | Mod: 25,S$PBB,, | Performed by: PHYSICIAN ASSISTANT

## 2025-06-10 PROCEDURE — 20610 DRAIN/INJ JOINT/BURSA W/O US: CPT | Mod: PBBFAC,LT | Performed by: PHYSICIAN ASSISTANT

## 2025-06-10 PROCEDURE — 20610 DRAIN/INJ JOINT/BURSA W/O US: CPT | Mod: S$PBB,LT,, | Performed by: PHYSICIAN ASSISTANT

## 2025-06-10 NOTE — PROGRESS NOTES
CC: Left knee pain      HPI:  Yoni presents for evaluation of left knee pain and swelling, ongoing for months with recent increase in severity. The issue began after potentially twisting/injuring her left knee but does not recall a specific event. Left knee pain and swelling have significantly impacted mobility.    She reports left knee instability when walking and bending, accompanied by popping or clicking sounds. She describes the discomfort as tension from fluid pressure rather than pain during movement. A compression brace has been used, though it has been tight lately due to increased swelling.    Initially, she visited the ER for knee issues. Her primary care physician referred her to orthopedics for further evaluation. She has been seen and treated by Duy Rodriguez PA-C at [facility] where she has had her knee aspirated on 2 separate occasions, and received a steroid injection at her initial appointment with him almost 3 months ago.  She states that the first treatment provided relief until the following Friday. The second drainage was performed on May 30th without an injection due to the three-month interval requirement. The knee swelled up again within 24 hours after both drainage procedures.    XR Left Knee were taken on May 30th, followed by an MRI which revealed a lateral meniscus tear.    She feels that her pain has been gradually worsening over the past several weeks, however does not recall any new injuries.  It has become progressively more painful for her to put weight on her knee and ambulate without assistance.    PREVIOUS TREATMENTS:  Yoni has undergone knee fluid drainage twice. The first aspiration, which included a steroid injection, provided relief until May 30th. She has also been using a compression brace with mixed results. Recently, she reported that the brace has been tight due to swelling.    WORK STATUS:  Yoni works in transportation as a . Despite her knee issues,  she has continued working, although she did take a short period off work after the initial knee problem. Her knee problems have slowed down her ability to move around at work.         + mechanical symptoms, + instability    Is affecting ADLs.  Pain is 9/10 at it's worst.    REVIEW OF SYSTEMS:  Constitution: Negative. Negative for chills, fever and night sweats.   HENT: Negative for congestion and headaches.    Eyes: Negative for blurred vision, left vision loss and right vision loss.   Cardiovascular: Negative for chest pain and syncope.   Respiratory: Negative for cough and shortness of breath.    Endocrine: Negative for polydipsia, polyphagia and polyuria.   Hematologic/Lymphatic: Negative for bleeding problem. Does not bruise/bleed easily.   Skin: Negative for dry skin, itching and rash.   Musculoskeletal: Negative for falls. Positive for left knee pain and  muscle weakness.   Gastrointestinal: Negative for abdominal pain and bowel incontinence.   Genitourinary: Negative for bladder incontinence and nocturia.   Neurological: Negative for disturbances in coordination, loss of balance and seizures.   Psychiatric/Behavioral: Negative for depression. The patient does not have insomnia.    Allergic/Immunologic: Negative for hives and persistent infections.     PAST MEDICAL HISTORY:    Past Medical History:   Diagnosis Date    Abnormal uterine bleeding (AUB)     Back pain     CHA (iron deficiency anemia)     Pelvic pain        PAST SURGICAL HISTORY:   Past Surgical History:   Procedure Laterality Date    DILATION AND CURETTAGE OF UTERUS USING SUCTION N/A 2/10/2021    Procedure: DILATION AND CURETTAGE, UTERUS, USING SUCTION;  Surgeon: Cristobal Song MD;  Location: [facility];  Service: OB/GYN;  Laterality: N/A;    HYSTEROSCOPY N/A 7/15/2019    Procedure: HYSTEROSCOPY;  Surgeon: Roseann Lauren MD;  Location: [facility];  Service: OB/GYN;  Laterality: N/A;    HYSTEROSCOPY WITH DILATION AND CURETTAGE OF UTERUS N/A 2/10/2021     "Procedure: HYSTEROSCOPY, WITH DILATION AND CURETTAGE OF UTERUS;  Surgeon: Cristobal Song MD;  Location: [facility];  Service: OB/GYN;  Laterality: N/A;    SALPINGECTOMY Right 2/10/2021    Procedure: SALPINGECTOMY;  Surgeon: Cristobal Song MD;  Location: [facility] OR;  Service: OB/GYN;  Laterality: Right;    SALPINGOOPHORECTOMY Left 2/10/2021    Procedure: SALPINGO-OOPHORECTOMY;  Surgeon: Cristobal Song MD;  Location: [facility] OR;  Service: OB/GYN;  Laterality: Left;  with Left Ovarian cystectomy    THERMAL ABLATION OF ENDOMETRIUM USING HYSTEROSCOPY N/A 7/15/2019    Procedure: ABLATION, ENDOMETRIUM, THERMAL, HYSTEROSCOPIC;  Surgeon: Roseann Lauren MD;  Location: [facility] OR;  Service: OB/GYN;  Laterality: N/A;    TOTAL ABDOMINAL HYSTERECTOMY N/A 2/10/2021    Procedure: HYSTERECTOMY, TOTAL, ABDOMINAL;  Surgeon: Cristobal Song MD;  Location: [facility] OR;  Service: OB/GYN;  Laterality: N/A;  DONE  EMERGENCY     TUBAL LIGATION         FAMILY HISTORY:   Family History   Problem Relation Name Age of Onset    Lupus Mother      No Known Problems Father         SOCIAL HISTORY:   Social History[1]    MEDICATIONS:   Current Medications[2]    ALLERGIES:   Review of patient's allergies indicates:  No Known Allergies    VITAL SIGNS:   /88   Pulse 75   Ht 5' 4" (1.626 m)   Wt 88.3 kg (194 lb 10.7 oz)   LMP 06/04/2019   BMI 33.41 kg/m²      PHYSICAL EXAMINATION  General:  The patient is alert and oriented x 3.  Mood is pleasant.  Observation of ears, eyes and nose reveal no gross abnormalities.  No labored breathing observed.    LEFT KNEE EXAMINATION     OBSERVATION / INSPECTION   Gait:   antalgic   Alignment:  Neutral   Scars:   None   Muscle atrophy: Mild  Effusion:  2+   Warmth:  None   Discoloration:   none     TENDERNESS / CREPITUS (T / C):          T / C      T / C   Patella   - / -   Lateral joint line   + / -   Peripatellar medial  -  Medial joint line    + / -   Peripatellar lateral -  Medial plica   - / -   Patellar tendon + "   Popliteal fossa   - / -   Quad tendon   -   Gastrocnemius   -   Prepatellar Bursa - / -   Quadricep   -   Tibial tubercle  -  Thigh/hamstring  -   Pes anserine/HS -  Fibula    -   ITB   - / -  Tibia     -   Tib/fib joint  - / -  LCL    -     MFC   + / -   MCL: Proximal  -    LFC   + / -    Distal   -          ROM: (* = pain)  PASSIVE   ACTIVE    Left :   5* / 5* / 40*   5* / 5* / 30*     Right :        Patellofemoral examination:  See above noted areas of tenderness.   Patella position    Subluxation / dislocation: Centered           Sup. / Inf;   Normal   Crepitus (PF):    Absent   Patellar Mobility:       Medial-lateral:   Normal    Superior-inferior:  Normal    Inferior tilt   Normal    Patellar tendon:  Normal   Lateral tilt:    Normal   J-sign:     None   Patellofemoral grind:   + pain       MENISCAL SIGNS:     Pain on terminal extension:  +  Pain on terminal flexion:  +  Maynors maneuver:  + (for pain)  Squat     deferred    LIGAMENT EXAMINATION: *guarding during exam*  ACL / Lachman:  normal (-1 to 2mm)    PCL-Post.  drawer: normal 0 to 2mm  MCL- Valgus:  normal 0 to 2mm  LCL- Varus:  normal 0 to 2mm  Pivot shift: normal (Equal)   Dial Test: difference c/w other side   At 30° flexion: normal (< 5°)    At 90° flexion: normal (< 5°)   Reverse Pivot Shift:   normal (Equal)     STRENGTH: (* = with pain) PAINFUL SIDE   Quadricep   5/5   Hamstrin/5    EXTREMITY NEURO-VASCULAR EXAMINATION:   Sensation:  Grossly intact to light touch all dermatomal regions.   Motor Function:  Fully intact motor function at hip, knee, foot and ankle    DTRs;  quadriceps and  achilles 2+.  No clonus and downgoing Babinski.    Vascular status:  DP and PT pulses 2+, brisk capillary refill, symmetric.     OTHER FINDINGS:  none    X-rays left knee (2025):     X-rays including standing, weight bearing AP and flexion bilateral knees, lateral and merchant views ordered and images reviewed by me  show:   No acute fracture or dislocation.  Mild medial and lateral tibiofemoral joint space narrowing bilaterally.  No significant osteophytosis.  Soft tissues appear normal.  Appropriate patellofemoral relationship.    Kellgren Matthew grade 2    MRI left knee (3/3/2025):  Impression:     Complex tear anterior horn lateral meniscus.     Large suprapatellar effusion with synovitis.     Cartilaginous fissuring medial and lateral tibiofemoral joint space with bone marrow edema like signal intensity anterior aspect lateral tibial plateau associated with anterior horn lateral meniscal tear.         ASSESSMENT:    1. Chronic pain of left knee  MRI Knee Without Contrast Left    Large Joint Aspiration/Injection: L knee      2. Old complex tear of lateral meniscus of left knee  MRI Knee Without Contrast Left    Large Joint Aspiration/Injection: L knee      3. Knee effusion, left                PLAN:    I made the decision to obtain old records of the patient including previous notes and imaging. New imaging was ordered today of the extremity or extremities evaluated. I independently reviewed and interpreted the radiographs and/or MRIs today as well as prior imaging, if available.    We discussed at length different treatment options including conservative vs surgical management. These include anti-inflammatories, acetaminophen, rest, ice, heat, formal physical therapy including strengthening and stretching exercises, home exercise programs, dry needling, corticosteroid versus viscosupplementation injections, and finally surgical intervention.      50 cc of serosanguineous fluid aspirated from the left knee.  Due to the bloody nature of the fluid, I refrained from administering a steroid injection at that time.  See procedure note for details.    Orders placed for updated MRI of the left knee to further evaluate for worsening internal derangement/possible new insufficiency fracture.    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  She has continued to work despite knee pain, however this is becoming progressively more difficult.  She would like to continue to work in some sort of capacity to avoid extended time away.  We will get her set up with a new hinged knee brace today for added stability.    Continue conservative treatment at home with over-the-counter anti-inflammatories/acetaminophen and regular cool compresses.  Advised patient to regularly elevate the leg to help reduce swelling.    Follow up after MRI obtained to discuss further treatment.    All questions were answered, pt will contact us for questions or concerns in the interim.      This note was generated with the assistance of ambient listening technology. Verbal consent was obtained by the patient and accompanying visitor(s) for the recording of patient appointment to facilitate this note. I attest to having reviewed and edited the generated note for accuracy, though some syntax or spelling errors may persist. Please contact the author of this note for any clarification.                 [1]   Social History  Socioeconomic History    Marital status: Single   Tobacco Use    Smoking status: Former     Current packs/day: 0.00     Types: Cigarettes     Quit date: 2023     Years since quittin.1    Smokeless tobacco: Never    Tobacco comments:     3 cigarettes per day   Substance and Sexual Activity    Alcohol use: Yes     Comment: occasionally    Drug use: No    Sexual activity: Not Currently     Partners: Male     Birth control/protection: See Surgical Hx     Comment: with one partner for 7 years   Social History Narrative    ** Merged History Encounter **        [2]   Current Outpatient Medications:     losartan (COZAAR) 25 MG tablet, Take 1 tablet (25 mg total) by mouth once daily., Disp: 30 tablet, Rfl: 11    meloxicam  (MOBIC) 15 MG tablet, Take 1 tablet (15 mg total) by mouth once daily., Disp: 30 tablet, Rfl: 0    methylPREDNISolone (MEDROL DOSEPACK) 4 mg tablet, Take as directed, Disp: 1 each, Rfl: 0    pravastatin (PRAVACHOL) 40 MG tablet, Take 1 tablet by mouth once daily, Disp: 30 tablet, Rfl: 11    HYDROcodone-acetaminophen (NORCO) 5-325 mg per tablet, Take 1 tablet by mouth every 8 (eight) hours as needed for Pain. (Patient not taking: Reported on 6/10/2025), Disp: 12 tablet, Rfl: 0

## 2025-06-10 NOTE — PROCEDURES
Large Joint Aspiration/Injection: L knee    Date/Time: 6/10/2025 10:30 AM    Performed by: Jamarcus Jaquez PA-C  Authorized by: Jamarcus Jaquez PA-C    Consent Done?:  Yes (Verbal)  Indications:  Joint swelling  Site marked: the procedure site was marked    Timeout: prior to procedure the correct patient, procedure, and site was verified    Prep: patient was prepped and draped in usual sterile fashion      Local anesthesia used?: Yes    Local anesthetic: naropin 0.2%  Anesthetic total (ml):  10      Details:  Needle Size:  18 G  Ultrasonic Guidance for needle placement?: No    Approach:  Superior  Location:  Knee  Site:  L knee  Aspirate amount (mL):  50  Aspirate:  Blood-tinged  Patient tolerance:  Patient tolerated the procedure well with no immediate complications    Aspiration Procedure  A time out was performed, including verification of patient ID, procedure, site and side, availability of information and equipment, review of safety issues, and agreement with consent, the procedure site was marked.    After time out was performed, the patient was prepped aseptically with povidone-iodine swabsticks. Approximately 10 cc of 1% lidocaine plain was injected with a 25-gauge needle into the aspiration site without difficulty.  50cc's of serosanguineous joint fluid were aspirated from the Superolateral  aspect of the left Knee Joint using an 18g x 1.5 needle in sterile fashion without complication. Bandage was applied.     Yoni Tena was reminded to rest with RICE for 48 hours post injection and to call the clinic immediately for any adverse side effects as explained in clinic today.

## 2025-06-22 ENCOUNTER — HOSPITAL ENCOUNTER (OUTPATIENT)
Dept: RADIOLOGY | Facility: HOSPITAL | Age: 55
Discharge: HOME | End: 2025-06-22
Attending: PHYSICIAN ASSISTANT

## 2025-06-22 DIAGNOSIS — G89.29 CHRONIC PAIN OF LEFT KNEE: ICD-10-CM

## 2025-06-22 DIAGNOSIS — M23.201 OLD COMPLEX TEAR OF LATERAL MENISCUS OF LEFT KNEE: ICD-10-CM

## 2025-06-22 DIAGNOSIS — M25.562 CHRONIC PAIN OF LEFT KNEE: ICD-10-CM

## 2025-06-22 PROCEDURE — 73721 MRI JNT OF LWR EXTRE W/O DYE: CPT | Mod: TC,LT

## 2025-06-22 PROCEDURE — 73721 MRI JNT OF LWR EXTRE W/O DYE: CPT | Mod: 26,LT,, | Performed by: INTERNAL MEDICINE

## 2025-06-25 ENCOUNTER — OFFICE VISIT (OUTPATIENT)
Dept: SPORTS MEDICINE | Facility: CLINIC | Age: 55
End: 2025-06-25

## 2025-06-25 VITALS
HEART RATE: 74 BPM | SYSTOLIC BLOOD PRESSURE: 131 MMHG | WEIGHT: 194.69 LBS | BODY MASS INDEX: 33.24 KG/M2 | HEIGHT: 64 IN | DIASTOLIC BLOOD PRESSURE: 82 MMHG

## 2025-06-25 DIAGNOSIS — M23.201 OLD COMPLEX TEAR OF LATERAL MENISCUS OF LEFT KNEE: ICD-10-CM

## 2025-06-25 DIAGNOSIS — M25.562 CHRONIC PAIN OF LEFT KNEE: Primary | ICD-10-CM

## 2025-06-25 DIAGNOSIS — M25.462 KNEE EFFUSION, LEFT: ICD-10-CM

## 2025-06-25 DIAGNOSIS — M76.52 PATELLAR TENDINITIS OF LEFT KNEE: ICD-10-CM

## 2025-06-25 DIAGNOSIS — G89.29 CHRONIC PAIN OF LEFT KNEE: Primary | ICD-10-CM

## 2025-06-25 PROCEDURE — 99214 OFFICE O/P EST MOD 30 MIN: CPT | Mod: S$PBB,,, | Performed by: PHYSICIAN ASSISTANT

## 2025-06-25 PROCEDURE — 99999 PR PBB SHADOW E&M-EST. PATIENT-LVL III: CPT | Mod: PBBFAC,,, | Performed by: PHYSICIAN ASSISTANT

## 2025-06-25 PROCEDURE — 99213 OFFICE O/P EST LOW 20 MIN: CPT | Mod: PBBFAC | Performed by: PHYSICIAN ASSISTANT

## 2025-06-25 NOTE — PROGRESS NOTES
CC: Left knee pain    Patient returns to clinic today for follow-up evaluation of left knee pain.  She denies any new injuries or trauma to the left knee since her last visit.  She continues to struggle with constant pain in the left knee, worse with bearing weight, walking, and bending the knee.  She reports continued swelling and instability secondary to pain despite wearing a hinged knee brace.  Treatment thus far has included multiple joint aspirations and an intra-articular corticosteroid injection which provided limited relief.  Here today to discuss further treatment options.    HPI (6/10/2025):  Yoni presents for evaluation of left knee pain and swelling, ongoing for months with recent increase in severity. The issue began after potentially twisting/injuring her left knee but does not recall a specific event. Left knee pain and swelling have significantly impacted mobility.    She reports left knee instability when walking and bending, accompanied by popping or clicking sounds. She describes the discomfort as tension from fluid pressure rather than pain during movement. A compression brace has been used, though it has been tight lately due to increased swelling.    Initially, she visited the ER for knee issues. Her primary care physician referred her to orthopedics for further evaluation. She has been seen and treated by Duy Rodriguez PA-C at [facility] where she has had her knee aspirated on 2 separate occasions, and received a steroid injection at her initial appointment with him almost 3 months ago.  She states that the first treatment provided relief until the following Friday. The second drainage was performed on May 30th without an injection due to the three-month interval requirement. The knee swelled up again within 24 hours after both drainage procedures.    XR Left Knee were taken on May 30th, followed by an MRI which revealed a lateral meniscus tear.    She feels that her pain has been  gradually worsening over the past several weeks, however does not recall any new injuries.  It has become progressively more painful for her to put weight on her knee and ambulate without assistance.    PREVIOUS TREATMENTS:  Yoni has undergone knee fluid drainage twice. The first aspiration, which included a steroid injection, provided relief until May 30th. She has also been using a compression brace with mixed results. Recently, she reported that the brace has been tight due to swelling.    WORK STATUS:  Yoni works in transportation as a . Despite her knee issues, she has continued working, although she did take a short period off work after the initial knee problem. Her knee problems have slowed down her ability to move around at work.         + mechanical symptoms, + instability    Is affecting ADLs.  Pain is 9/10 at it's worst.    REVIEW OF SYSTEMS:  Constitution: Negative. Negative for chills, fever and night sweats.   HENT: Negative for congestion and headaches.    Eyes: Negative for blurred vision, left vision loss and right vision loss.   Cardiovascular: Negative for chest pain and syncope.   Respiratory: Negative for cough and shortness of breath.    Endocrine: Negative for polydipsia, polyphagia and polyuria.   Hematologic/Lymphatic: Negative for bleeding problem. Does not bruise/bleed easily.   Skin: Negative for dry skin, itching and rash.   Musculoskeletal: Negative for falls. Positive for left knee pain and  muscle weakness.   Gastrointestinal: Negative for abdominal pain and bowel incontinence.   Genitourinary: Negative for bladder incontinence and nocturia.   Neurological: Negative for disturbances in coordination, loss of balance and seizures.   Psychiatric/Behavioral: Negative for depression. The patient does not have insomnia.    Allergic/Immunologic: Negative for hives and persistent infections.     PAST MEDICAL HISTORY:    Past Medical History:   Diagnosis Date    Abnormal uterine  "bleeding (AUB)     Back pain     CHA (iron deficiency anemia)     Pelvic pain        PAST SURGICAL HISTORY:   Past Surgical History:   Procedure Laterality Date    DILATION AND CURETTAGE OF UTERUS USING SUCTION N/A 2/10/2021    Procedure: DILATION AND CURETTAGE, UTERUS, USING SUCTION;  Surgeon: Cristobal Song MD;  [facility];  Service: OB/GYN;  Laterality: N/A;    HYSTEROSCOPY N/A 7/15/2019    Procedure: HYSTEROSCOPY;  Surgeon: Roseann Lauren MD;  [facility];  Service: OB/GYN;  Laterality: N/A;    HYSTEROSCOPY WITH DILATION AND CURETTAGE OF UTERUS N/A 2/10/2021    Procedure: HYSTEROSCOPY, WITH DILATION AND CURETTAGE OF UTERUS;  Surgeon: Cristobal Song MD;  [facility];  Service: OB/GYN;  Laterality: N/A;    SALPINGECTOMY Right 2/10/2021    Procedure: SALPINGECTOMY;  Surgeon: Cristobal Song MD;  [facility];  Service: OB/GYN;  Laterality: Right;    SALPINGOOPHORECTOMY Left 2/10/2021    Procedure: SALPINGO-OOPHORECTOMY;  Surgeon: Cristobal Song MD;  [facility];  Service: OB/GYN;  Laterality: Left;  with Left Ovarian cystectomy    THERMAL ABLATION OF ENDOMETRIUM USING HYSTEROSCOPY N/A 7/15/2019    Procedure: ABLATION, ENDOMETRIUM, THERMAL, HYSTEROSCOPIC;  Surgeon: Roseann Lauren MD;  [facility];  Service: OB/GYN;  Laterality: N/A;    TOTAL ABDOMINAL HYSTERECTOMY N/A 2/10/2021    Procedure: HYSTERECTOMY, TOTAL, ABDOMINAL;  Surgeon: Cristobal Song MD;  [facility];  Service: OB/GYN;  Laterality: N/A;  DONE  EMERGENCY     TUBAL LIGATION         FAMILY HISTORY:   Family History   Problem Relation Name Age of Onset    Lupus Mother      No Known Problems Father         SOCIAL HISTORY:   Social History[1]    MEDICATIONS:   Current Medications[2]    ALLERGIES:   Review of patient's allergies indicates:  No Known Allergies    VITAL SIGNS:   /82   Pulse 74   Ht 5' 4" (1.626 m)   Wt 88.3 kg (194 lb 10.7 oz)   LMP 06/04/2019   BMI 33.41 kg/m²      PHYSICAL " EXAMINATION  General:  The patient is alert and oriented x 3.  Mood is pleasant.  Observation of ears, eyes and nose reveal no gross abnormalities.  No labored breathing observed.    LEFT KNEE EXAMINATION     OBSERVATION / INSPECTION   Gait:   antalgic   Alignment:  Neutral   Scars:   None   Muscle atrophy: Mild  Effusion:  2+   Warmth:  None   Discoloration:   none     TENDERNESS / CREPITUS (T / C):          T / C      T / C   Patella   - / -   Lateral joint line   + / -   Peripatellar medial  -  Medial joint line    + / -   Peripatellar lateral -  Medial plica   - / -   Patellar tendon +   Popliteal fossa   - / -   Quad tendon   -   Gastrocnemius   -   Prepatellar Bursa - / -   Quadricep   -   Tibial tubercle  -  Thigh/hamstring  -   Pes anserine/HS -  Fibula    -   ITB   - / -  Tibia     -   Tib/fib joint  - / -  LCL    -     MFC   + / -   MCL: Proximal  -    LFC   + / -    Distal   -          ROM: (* = pain)  PASSIVE   ACTIVE    Left :   5* / 5* / 40*   5* / 5* / 30*     Right :    5 / 0 / 135   5 / 0 / 135    Patellofemoral examination:  See above noted areas of tenderness.   Patella position    Subluxation / dislocation: Centered           Sup. / Inf;   Normal   Crepitus (PF):    Absent   Patellar Mobility:       Medial-lateral:   Normal    Superior-inferior:  Normal    Inferior tilt   Normal    Patellar tendon:  Normal   Lateral tilt:    Normal   J-sign:     None   Patellofemoral grind:   + pain       MENISCAL SIGNS:     Pain on terminal extension:  +  Pain on terminal flexion:  +  Maynors maneuver:  + (for pain)  Squat     deferred    LIGAMENT EXAMINATION: *guarding during exam*  ACL / Lachman:  normal (-1 to 2mm)    PCL-Post.  drawer: normal 0 to 2mm  MCL- Valgus:  normal 0 to 2mm  LCL- Varus:  normal 0 to 2mm  Pivot shift: normal (Equal)   Dial Test: difference c/w other side   At 30° flexion: normal (< 5°)    At 90° flexion: normal (< 5°)   Reverse Pivot Shift:   normal (Equal)     STRENGTH: (* =  with pain) PAINFUL SIDE   Quadricep   5/5   Hamstrin/5    EXTREMITY NEURO-VASCULAR EXAMINATION:   Sensation:  Grossly intact to light touch all dermatomal regions.   Motor Function:  Fully intact motor function at hip, knee, foot and ankle    DTRs;  quadriceps and  achilles 2+.  No clonus and downgoing Babinski.    Vascular status:  DP and PT pulses 2+, brisk capillary refill, symmetric.     OTHER FINDINGS:  none    X-rays left knee (2025):     X-rays including standing, weight bearing AP and flexion bilateral knees, lateral and merchant views ordered and images reviewed by me show:   No acute fracture or dislocation.  Mild medial and lateral tibiofemoral joint space narrowing bilaterally.  No significant osteophytosis.  Soft tissues appear normal.  Appropriate patellofemoral relationship.    Kellgren Matthew grade 2    MRI left knee (3/3/2025):  Impression:     Complex tear anterior horn lateral meniscus.     Large suprapatellar effusion with synovitis.     Cartilaginous fissuring medial and lateral tibiofemoral joint space with bone marrow edema like signal intensity anterior aspect lateral tibial plateau associated with anterior horn lateral meniscal tear.     MRI left knee (2025):  Impression:     Complex tear of the anterior horn lateral meniscus, similar to 2025.     Tricompartmental chondromalacia.     Large joint effusion with synovitis.     Previous bone marrow edema in the lateral tibial plateau has resolved.  No fracture.    ASSESSMENT:    1. Chronic pain of left knee        2. Old complex tear of lateral meniscus of left knee        3. Knee effusion, left        4. Patellar tendinitis of left knee                  PLAN:    Treatment options were discussed with the patient about her knee.  I reviewed the MRI images with her, including the relevant anatomy, and what this means for his knee.    We discussed both non-operative and operative options for her knee and the risks and  benefits of each.    I feel like she would benefit from surgery for her knee.  After a discussion of specific risks and benefits, she would like to proceed with setting this up.    These risks include: bleeding, infection, scarring, damage to neurovascular structures, damage to cartilage, stiffness, blood clots, pulmonary embolism, swelling, compartment syndrome, need for further surgery, and the risks of anesthesia.      She verbalized her understanding of these risks and wished to proceed with surgery.    A total of 25 minutes were spent face-to-face with the patient during this encounter and over half of that time was spent on counseling about treatment options including his choice for surgery and coordination of her care for her preoperative visits, surgery and post-operative rehab.  We also had a detailed discussion of her expectation level for this procedure as well as any limitations at home or work and with exercising following surgery.     The operative plan is:    left   1. Arthroscopic partial meniscectomy versus repair  2. Possible arthroscopic synovectomy  3. Possible arthroscopic loose body removal  4. Possible arthroscopic chondroplasty    Position: Supine    Patient will  need medical clearance prior to the pre-operative appointment.    If he wishes to proceed, we'll get his scheduled for this at his convenience around his work schedule.      Follow up after MRI obtained to discuss further treatment.      All questions were answered, pt will contact us for questions or concerns in the interim.      This note was generated with the assistance of ambient listening technology. Verbal consent was obtained by the patient and accompanying visitor(s) for the recording of patient appointment to facilitate this note. I attest to having reviewed and edited the generated note for accuracy, though some syntax or spelling errors may persist. Please contact the author of this note for any  clarification.                   [1]   Social History  Socioeconomic History    Marital status: Single   Tobacco Use    Smoking status: Former     Current packs/day: 0.00     Types: Cigarettes     Quit date: 2023     Years since quittin.1    Smokeless tobacco: Never    Tobacco comments:     3 cigarettes per day   Substance and Sexual Activity    Alcohol use: Yes     Comment: occasionally    Drug use: No    Sexual activity: Not Currently     Partners: Male     Birth control/protection: See Surgical Hx     Comment: with one partner for 7 years   Social History Narrative    ** Merged History Encounter **        [2]   Current Outpatient Medications:     losartan (COZAAR) 25 MG tablet, Take 1 tablet (25 mg total) by mouth once daily., Disp: 30 tablet, Rfl: 11    meloxicam (MOBIC) 15 MG tablet, Take 1 tablet (15 mg total) by mouth once daily., Disp: 30 tablet, Rfl: 0    methylPREDNISolone (MEDROL DOSEPACK) 4 mg tablet, Take as directed, Disp: 1 each, Rfl: 0    pravastatin (PRAVACHOL) 40 MG tablet, Take 1 tablet by mouth once daily, Disp: 30 tablet, Rfl: 11    HYDROcodone-acetaminophen (NORCO) 5-325 mg per tablet, Take 1 tablet by mouth every 8 (eight) hours as needed for Pain. (Patient not taking: Reported on 2025), Disp: 12 tablet, Rfl: 0

## 2025-06-30 ENCOUNTER — TELEPHONE (OUTPATIENT)
Dept: SPORTS MEDICINE | Facility: CLINIC | Age: 55
End: 2025-06-30
Payer: COMMERCIAL

## 2025-06-30 DIAGNOSIS — M23.201 OLD COMPLEX TEAR OF LATERAL MENISCUS OF LEFT KNEE: Primary | ICD-10-CM

## 2025-06-30 NOTE — TELEPHONE ENCOUNTER
LVM for patient to scheduled surgery with Jeremiah for her left knee for 7/7/25.     Told to call back asap.     Scheduled with pre op center for clearance on 7/1/25.

## 2025-07-10 DIAGNOSIS — M23.201 OLD COMPLEX TEAR OF LATERAL MENISCUS OF LEFT KNEE: Primary | ICD-10-CM

## 2025-07-10 DIAGNOSIS — M25.562 CHRONIC PAIN OF LEFT KNEE: ICD-10-CM

## 2025-07-10 DIAGNOSIS — G89.29 CHRONIC PAIN OF LEFT KNEE: ICD-10-CM

## 2025-07-10 RX ORDER — CELECOXIB 200 MG/1
200 CAPSULE ORAL 2 TIMES DAILY
Qty: 60 CAPSULE | Refills: 1 | Status: SHIPPED | OUTPATIENT
Start: 2025-07-10

## 2025-07-11 ENCOUNTER — OFFICE VISIT (OUTPATIENT)
Dept: SPORTS MEDICINE | Facility: CLINIC | Age: 55
End: 2025-07-11
Payer: COMMERCIAL

## 2025-07-11 ENCOUNTER — TELEPHONE (OUTPATIENT)
Dept: SPORTS MEDICINE | Facility: CLINIC | Age: 55
End: 2025-07-11

## 2025-07-11 VITALS — HEIGHT: 64 IN | WEIGHT: 191.81 LBS | BODY MASS INDEX: 32.74 KG/M2

## 2025-07-11 DIAGNOSIS — M23.201 OLD COMPLEX TEAR OF LATERAL MENISCUS OF LEFT KNEE: Primary | ICD-10-CM

## 2025-07-11 DIAGNOSIS — M25.462 KNEE EFFUSION, LEFT: ICD-10-CM

## 2025-07-11 DIAGNOSIS — M25.562 CHRONIC PAIN OF LEFT KNEE: ICD-10-CM

## 2025-07-11 DIAGNOSIS — G89.29 CHRONIC PAIN OF LEFT KNEE: ICD-10-CM

## 2025-07-11 PROCEDURE — 99999 PR PBB SHADOW E&M-EST. PATIENT-LVL III: CPT | Mod: PBBFAC,,, | Performed by: PHYSICIAN ASSISTANT

## 2025-07-11 NOTE — PROCEDURES
Large Joint Aspiration/Injection: L knee    Date/Time: 7/11/2025 10:45 AM    Performed by: Jamarcus Jaquez PA-C  Authorized by: Jamarcus Jaquez PA-C    Consent Done?:  Yes (Verbal)  Indications:  Joint swelling  Site marked: the procedure site was marked    Timeout: prior to procedure the correct patient, procedure, and site was verified    Prep: patient was prepped and draped in usual sterile fashion      Local anesthesia used?: Yes    Local anesthetic: naropin 0.2%  Anesthetic total (ml):  10      Details:  Needle Size:  18 G  Ultrasonic Guidance for needle placement?: No    Approach:  Superior  Location:  Knee  Site:  L knee  Aspirate amount (mL):  48  Aspirate:  Blood-tinged and serous  Patient tolerance:  Patient tolerated the procedure well with no immediate complications    Aspiration Procedure  A time out was performed, including verification of patient ID, procedure, site and side, availability of information and equipment, review of safety issues, and agreement with consent, the procedure site was marked.    After time out was performed, the patient was prepped aseptically with povidone-iodine swabsticks. Approximately 10 cc of 1% lidocaine plain was injected with a 25-gauge needle into the aspiration site without difficulty.  48cc's of serosanguineous joint fluid were aspirated from the Superolateral  aspect of the left Knee Joint using an 18g x 1.5 needle in sterile fashion without complication. Bandage was applied.     Yoni Tena was reminded to rest with RICE for 48 hours post injection and to call the clinic immediately for any adverse side effects as explained in clinic today.

## 2025-07-11 NOTE — TELEPHONE ENCOUNTER
Called and spoke with patient informing her that her requested paperwork is available to be picked up at the  . Patient expressed understanding and agreed with treatment plan.

## 2025-07-11 NOTE — PROGRESS NOTES
CC: Left knee pain    Patient returns to clinic today for follow-up evaluation of left knee pain.  No new injuries or trauma to the knee since her last visit.  She is scheduled for left knee arthroscopy on August 4th.  Pending cardiology clearance prior to proceeding with surgery.  She has been having increased pain and swelling of the left knee along with instability which has been affecting her abilities at work.  She has been taking meloxicam with little relief.  I sent in a prescription for Celebrex yesterday which she will be picking up from the pharmacy today.  Requesting to have her knee drained today.    Interval Hx (6/25/2025):  Patient returns to clinic today for follow-up evaluation of left knee pain.  She denies any new injuries or trauma to the left knee since her last visit.  She continues to struggle with constant pain in the left knee, worse with bearing weight, walking, and bending the knee.  She reports continued swelling and instability secondary to pain despite wearing a hinged knee brace.  Treatment thus far has included multiple joint aspirations and an intra-articular corticosteroid injection which provided limited relief.  Here today to discuss further treatment options.    HPI (6/10/2025):  Yoni presents for evaluation of left knee pain and swelling, ongoing for months with recent increase in severity. The issue began after potentially twisting/injuring her left knee but does not recall a specific event. Left knee pain and swelling have significantly impacted mobility.    She reports left knee instability when walking and bending, accompanied by popping or clicking sounds. She describes the discomfort as tension from fluid pressure rather than pain during movement. A compression brace has been used, though it has been tight lately due to increased swelling.    Initially, she visited the ER for knee issues. Her primary care physician referred her to orthopedics for further evaluation. She  has been seen and treated by Duy Rodriguez PA-C at [facility] where she has had her knee aspirated on 2 separate occasions, and received a steroid injection at her initial appointment with him almost 3 months ago.  She states that the first treatment provided relief until the following Friday. The second drainage was performed on May 30th without an injection due to the three-month interval requirement. The knee swelled up again within 24 hours after both drainage procedures.    XR Left Knee were taken on May 30th, followed by an MRI which revealed a lateral meniscus tear.    She feels that her pain has been gradually worsening over the past several weeks, however does not recall any new injuries.  It has become progressively more painful for her to put weight on her knee and ambulate without assistance.    PREVIOUS TREATMENTS:  Yoni has undergone knee fluid drainage twice. The first aspiration, which included a steroid injection, provided relief until May 30th. She has also been using a compression brace with mixed results. Recently, she reported that the brace has been tight due to swelling.    WORK STATUS:  Yoni works in transportation as a . Despite her knee issues, she has continued working, although she did take a short period off work after the initial knee problem. Her knee problems have slowed down her ability to move around at work.         + mechanical symptoms, + instability    Is affecting ADLs.  Pain is 9/10 at it's worst.    REVIEW OF SYSTEMS:  Constitution: Negative. Negative for chills, fever and night sweats.   HENT: Negative for congestion and headaches.    Eyes: Negative for blurred vision, left vision loss and right vision loss.   Cardiovascular: Negative for chest pain and syncope.   Respiratory: Negative for cough and shortness of breath.    Endocrine: Negative for polydipsia, polyphagia and polyuria.   Hematologic/Lymphatic: Negative for bleeding problem. Does not bruise/bleed  easily.   Skin: Negative for dry skin, itching and rash.   Musculoskeletal: Negative for falls. Positive for left knee pain and  muscle weakness.   Gastrointestinal: Negative for abdominal pain and bowel incontinence.   Genitourinary: Negative for bladder incontinence and nocturia.   Neurological: Negative for disturbances in coordination, loss of balance and seizures.   Psychiatric/Behavioral: Negative for depression. The patient does not have insomnia.    Allergic/Immunologic: Negative for hives and persistent infections.     PAST MEDICAL HISTORY:    Past Medical History:   Diagnosis Date    Abnormal uterine bleeding (AUB)     Back pain     CHA (iron deficiency anemia)     Pelvic pain        PAST SURGICAL HISTORY:   Past Surgical History:   Procedure Laterality Date    DILATION AND CURETTAGE OF UTERUS USING SUCTION N/A 2/10/2021    Procedure: DILATION AND CURETTAGE, UTERUS, USING SUCTION;  Surgeon: Cristobal Song MD;  Location: [facility];  Service: OB/GYN;  Laterality: N/A;    HYSTEROSCOPY N/A 7/15/2019    Procedure: HYSTEROSCOPY;  Surgeon: Roseann Lauren MD;  Location: [facility];  Service: OB/GYN;  Laterality: N/A;    HYSTEROSCOPY WITH DILATION AND CURETTAGE OF UTERUS N/A 2/10/2021    Procedure: HYSTEROSCOPY, WITH DILATION AND CURETTAGE OF UTERUS;  Surgeon: Cristobal Song MD;  Location: [facility];  Service: OB/GYN;  Laterality: N/A;    SALPINGECTOMY Right 2/10/2021    Procedure: SALPINGECTOMY;  Surgeon: Cristobal Song MD;  Location: [facility];  Service: OB/GYN;  Laterality: Right;    SALPINGOOPHORECTOMY Left 2/10/2021    Procedure: SALPINGO-OOPHORECTOMY;  Surgeon: Cristobal Song MD;  Location: [facility];  Service: OB/GYN;  Laterality: Left;  with Left Ovarian cystectomy    THERMAL ABLATION OF ENDOMETRIUM USING HYSTEROSCOPY N/A 7/15/2019    Procedure: ABLATION, ENDOMETRIUM, THERMAL, HYSTEROSCOPIC;  Surgeon: Roseann Lauren MD;  Location: [facility];  Service: OB/GYN;  Laterality: N/A;    TOTAL ABDOMINAL HYSTERECTOMY N/A  "2/10/2021    Procedure: HYSTERECTOMY, TOTAL, ABDOMINAL;  Surgeon: Cristobal Song MD;  Location: [facility];  Service: OB/GYN;  Laterality: N/A;  DONE  EMERGENCY     TUBAL LIGATION         FAMILY HISTORY:   Family History   Problem Relation Name Age of Onset    Lupus Mother      No Known Problems Father         SOCIAL HISTORY:   Social History[1]    MEDICATIONS:   Current Medications[2]    ALLERGIES:   Review of patient's allergies indicates:  No Known Allergies    VITAL SIGNS:   Ht 5' 4" (1.626 m)   Wt 87 kg (191 lb 12.8 oz)   LMP 06/04/2019   BMI 32.92 kg/m²      PHYSICAL EXAMINATION  General:  The patient is alert and oriented x 3.  Mood is pleasant.  Observation of ears, eyes and nose reveal no gross abnormalities.  No labored breathing observed.    LEFT KNEE EXAMINATION     OBSERVATION / INSPECTION   Gait:   antalgic   Alignment:  Neutral   Scars:   None   Muscle atrophy: Mild  Effusion:  2+   Warmth:  None   Discoloration:   none     TENDERNESS / CREPITUS (T / C):          T / C      T / C   Patella   - / -   Lateral joint line   + / -   Peripatellar medial  -  Medial joint line    + / -   Peripatellar lateral -  Medial plica   - / -   Patellar tendon +   Popliteal fossa   - / -   Quad tendon   -   Gastrocnemius   -   Prepatellar Bursa - / -   Quadricep   -   Tibial tubercle  -  Thigh/hamstring  -   Pes anserine/HS -  Fibula    -   ITB   - / -  Tibia     -   Tib/fib joint  - / -  LCL    -     MFC   + / -   MCL: Proximal  -    LFC   + / -    Distal   -          ROM: (* = pain)  PASSIVE   ACTIVE    Left :   5* / 5* / 40*   5* / 5* / 30*     Right :    5 / 0 / 135   5 / 0 / 135    Patellofemoral examination:  See above noted areas of tenderness.   Patella position    Subluxation / dislocation: Centered           Sup. / Inf;   Normal   Crepitus (PF):    Absent   Patellar Mobility:       Medial-lateral:   Normal    Superior-inferior:  Normal    Inferior tilt   Normal    Patellar tendon:  Normal   Lateral " tilt:    Normal   J-sign:     None   Patellofemoral grind:   + pain       MENISCAL SIGNS:     Pain on terminal extension:  +  Pain on terminal flexion:  +  Maynors maneuver:  + (for pain)  Squat     deferred    LIGAMENT EXAMINATION: *guarding during exam*  ACL / Lachman:  normal (-1 to 2mm)    PCL-Post.  drawer: normal 0 to 2mm  MCL- Valgus:  normal 0 to 2mm  LCL- Varus:  normal 0 to 2mm  Pivot shift: normal (Equal)   Dial Test: difference c/w other side   At 30° flexion: normal (< 5°)    At 90° flexion: normal (< 5°)   Reverse Pivot Shift:   normal (Equal)     STRENGTH: (* = with pain) PAINFUL SIDE   Quadricep   5/5   Hamstrin/5    EXTREMITY NEURO-VASCULAR EXAMINATION:   Sensation:  Grossly intact to light touch all dermatomal regions.   Motor Function:  Fully intact motor function at hip, knee, foot and ankle    DTRs;  quadriceps and  achilles 2+.  No clonus and downgoing Babinski.    Vascular status:  DP and PT pulses 2+, brisk capillary refill, symmetric.     OTHER FINDINGS:  none    X-rays left knee (2025):     X-rays including standing, weight bearing AP and flexion bilateral knees, lateral and merchant views ordered and images reviewed by me show:   No acute fracture or dislocation.  Mild medial and lateral tibiofemoral joint space narrowing bilaterally.  No significant osteophytosis.  Soft tissues appear normal.  Appropriate patellofemoral relationship.    Kellgren Matthew grade 2    MRI left knee (3/3/2025):  Impression:     Complex tear anterior horn lateral meniscus.     Large suprapatellar effusion with synovitis.     Cartilaginous fissuring medial and lateral tibiofemoral joint space with bone marrow edema like signal intensity anterior aspect lateral tibial plateau associated with anterior horn lateral meniscal tear.     MRI left knee (2025):  Impression:     Complex tear of the anterior horn lateral meniscus, similar to 2025.     Tricompartmental chondromalacia.     Large  joint effusion with synovitis.     Previous bone marrow edema in the lateral tibial plateau has resolved.  No fracture.    ASSESSMENT:    1. Old complex tear of lateral meniscus of left knee        2. Chronic pain of left knee        3. Knee effusion, left                    PLAN:    Prior imaging reviewed.    Patient is scheduled for left knee arthroscopy on .  She is having significant pain and difficulty ambulating and is not able to fulfill her acquired work duties at this time due to her knee injury.  We will fill out paperwork to excuse her from work in the interim.    Left knee aspiration performed today.  See procedure note for details.    Patient provided with a set of crutches today in clinic.  Continue Celebrex 200 mg twice daily as needed for pain/swelling.  Encouraged patient to continue to regularly rest, ice, and elevate the left knee to help reduce swelling.    Follow up as scheduled for preoperative appointment.      All questions were answered, pt will contact us for questions or concerns in the interim.      This note was generated with the assistance of ambient listening technology. Verbal consent was obtained by the patient and accompanying visitor(s) for the recording of patient appointment to facilitate this note. I attest to having reviewed and edited the generated note for accuracy, though some syntax or spelling errors may persist. Please contact the author of this note for any clarification.                     [1]   Social History  Socioeconomic History    Marital status: Single   Tobacco Use    Smoking status: Former     Current packs/day: 0.00     Types: Cigarettes     Quit date: 2023     Years since quittin.2    Smokeless tobacco: Never    Tobacco comments:     3 cigarettes per day   Substance and Sexual Activity    Alcohol use: Yes     Comment: occasionally    Drug use: No    Sexual activity: Not Currently     Partners: Male     Birth control/protection: See  Surgical Hx     Comment: with one partner for 7 years   Social History Narrative    ** Merged History Encounter **        [2]   Current Outpatient Medications:     celecoxib (CELEBREX) 200 MG capsule, Take 1 capsule (200 mg total) by mouth 2 (two) times daily., Disp: 60 capsule, Rfl: 1    losartan (COZAAR) 25 MG tablet, Take 1 tablet (25 mg total) by mouth once daily., Disp: 30 tablet, Rfl: 11    methylPREDNISolone (MEDROL DOSEPACK) 4 mg tablet, Take as directed, Disp: 1 each, Rfl: 0    pravastatin (PRAVACHOL) 40 MG tablet, Take 1 tablet by mouth once daily, Disp: 30 tablet, Rfl: 11    HYDROcodone-acetaminophen (NORCO) 5-325 mg per tablet, Take 1 tablet by mouth every 8 (eight) hours as needed for Pain. (Patient not taking: Reported on 6/10/2025), Disp: 12 tablet, Rfl: 0

## 2025-07-25 ENCOUNTER — PATIENT MESSAGE (OUTPATIENT)
Dept: SPORTS MEDICINE | Facility: CLINIC | Age: 55
End: 2025-07-25
Payer: MEDICAID

## 2025-07-28 ENCOUNTER — PATIENT MESSAGE (OUTPATIENT)
Dept: PREADMISSION TESTING | Facility: HOSPITAL | Age: 55
End: 2025-07-28
Payer: MEDICAID

## 2025-07-31 ENCOUNTER — TELEPHONE (OUTPATIENT)
Dept: SPORTS MEDICINE | Facility: CLINIC | Age: 55
End: 2025-07-31

## 2025-07-31 ENCOUNTER — OFFICE VISIT (OUTPATIENT)
Dept: SPORTS MEDICINE | Facility: CLINIC | Age: 55
End: 2025-07-31
Payer: MEDICAID

## 2025-07-31 ENCOUNTER — OFFICE VISIT (OUTPATIENT)
Dept: CARDIOLOGY | Facility: CLINIC | Age: 55
End: 2025-07-31
Payer: MEDICAID

## 2025-07-31 ENCOUNTER — ANESTHESIA EVENT (OUTPATIENT)
Dept: SURGERY | Facility: HOSPITAL | Age: 55
End: 2025-07-31
Payer: MEDICAID

## 2025-07-31 VITALS
HEART RATE: 69 BPM | SYSTOLIC BLOOD PRESSURE: 152 MMHG | HEIGHT: 64 IN | BODY MASS INDEX: 33.74 KG/M2 | WEIGHT: 197.63 LBS | DIASTOLIC BLOOD PRESSURE: 91 MMHG

## 2025-07-31 VITALS
HEIGHT: 64 IN | WEIGHT: 198.63 LBS | BODY MASS INDEX: 33.91 KG/M2 | SYSTOLIC BLOOD PRESSURE: 141 MMHG | HEART RATE: 65 BPM | DIASTOLIC BLOOD PRESSURE: 85 MMHG

## 2025-07-31 DIAGNOSIS — Z01.810 PREOPERATIVE CARDIOVASCULAR EXAMINATION: Primary | ICD-10-CM

## 2025-07-31 DIAGNOSIS — M25.562 CHRONIC PAIN OF LEFT KNEE: ICD-10-CM

## 2025-07-31 DIAGNOSIS — I83.899 VARICOSE VEINS WITH SWELLING: ICD-10-CM

## 2025-07-31 DIAGNOSIS — M23.201 OLD COMPLEX TEAR OF LATERAL MENISCUS OF LEFT KNEE: Primary | ICD-10-CM

## 2025-07-31 DIAGNOSIS — R60.0 EDEMA OF BOTH LOWER EXTREMITIES: ICD-10-CM

## 2025-07-31 DIAGNOSIS — I83.813 VARICOSE VEINS OF BILATERAL LOWER EXTREMITIES WITH PAIN: ICD-10-CM

## 2025-07-31 DIAGNOSIS — I87.2 VENOUS INSUFFICIENCY OF BOTH LOWER EXTREMITIES: ICD-10-CM

## 2025-07-31 DIAGNOSIS — G89.29 CHRONIC PAIN OF LEFT KNEE: ICD-10-CM

## 2025-07-31 DIAGNOSIS — I10 ESSENTIAL HYPERTENSION: ICD-10-CM

## 2025-07-31 LAB
OHS QRS DURATION: 88 MS
OHS QTC CALCULATION: 421 MS

## 2025-07-31 PROCEDURE — 99499 UNLISTED E&M SERVICE: CPT | Mod: S$PBB,,, | Performed by: PHYSICIAN ASSISTANT

## 2025-07-31 PROCEDURE — 99999 PR PBB SHADOW E&M-EST. PATIENT-LVL III: CPT | Mod: PBBFAC,,, | Performed by: PHYSICIAN ASSISTANT

## 2025-07-31 PROCEDURE — 99999 PR PBB SHADOW E&M-EST. PATIENT-LVL III: CPT | Mod: PBBFAC,,, | Performed by: INTERNAL MEDICINE

## 2025-07-31 PROCEDURE — 99213 OFFICE O/P EST LOW 20 MIN: CPT | Mod: PBBFAC,27,25 | Performed by: INTERNAL MEDICINE

## 2025-07-31 PROCEDURE — 99213 OFFICE O/P EST LOW 20 MIN: CPT | Mod: PBBFAC | Performed by: PHYSICIAN ASSISTANT

## 2025-07-31 PROCEDURE — 93010 ELECTROCARDIOGRAM REPORT: CPT | Mod: S$PBB,,, | Performed by: STUDENT IN AN ORGANIZED HEALTH CARE EDUCATION/TRAINING PROGRAM

## 2025-07-31 PROCEDURE — 93005 ELECTROCARDIOGRAM TRACING: CPT | Mod: PBBFAC | Performed by: INTERNAL MEDICINE

## 2025-07-31 PROCEDURE — 93005 ELECTROCARDIOGRAM TRACING: CPT | Mod: PBBFAC | Performed by: STUDENT IN AN ORGANIZED HEALTH CARE EDUCATION/TRAINING PROGRAM

## 2025-07-31 RX ORDER — ASPIRIN 325 MG
325 TABLET ORAL DAILY
Qty: 21 TABLET | Refills: 0 | Status: SHIPPED | OUTPATIENT
Start: 2025-07-31 | End: 2025-08-22

## 2025-07-31 RX ORDER — PROMETHAZINE HYDROCHLORIDE 25 MG/1
25 TABLET ORAL EVERY 6 HOURS PRN
Qty: 20 TABLET | Refills: 0 | Status: SHIPPED | OUTPATIENT
Start: 2025-07-31

## 2025-07-31 RX ORDER — TRAMADOL HYDROCHLORIDE 50 MG/1
50 TABLET, FILM COATED ORAL EVERY 6 HOURS PRN
Qty: 20 TABLET | Refills: 0 | Status: SHIPPED | OUTPATIENT
Start: 2025-07-31

## 2025-07-31 RX ORDER — SODIUM CHLORIDE 9 MG/ML
INJECTION, SOLUTION INTRAVENOUS CONTINUOUS
OUTPATIENT
Start: 2025-07-31

## 2025-07-31 RX ORDER — HYDROCODONE BITARTRATE AND ACETAMINOPHEN 7.5; 325 MG/1; MG/1
1 TABLET ORAL EVERY 6 HOURS PRN
Qty: 20 TABLET | Refills: 0 | Status: SHIPPED | OUTPATIENT
Start: 2025-07-31

## 2025-07-31 NOTE — PROGRESS NOTES
[facility]      Chief Complaint   Patient presents with    Follow-up     2m       Patient ID: Yoni Tena is a 54 y.o. female with HTN, former smoker, obesity, who presents for a follow up appointment. Pertinent history events are as follows:     -Pt kindly referred by Nick Kirkland NP for evaluation of varicose veins.    -At our initial clinic visit on 2/25/2025, Mrs. Tena reported pain and swelling in both legs (L>R) which started 2 years ago. States pain and swelling in both legs is constant. Symptoms are improved with Tylenol and fluid pills. Smokes half pack a day for 10 years. Quit 1 month ago. Works in transportation. BLE Arterial Ultrasound on 11/25/2024 revealed atherosclerotic disease throughout both lower extremity arteries with no sonographic evidence to suggest focal hemodynamically significant stenosis.  Plan:  Leg Pain- Pt with bilateral leg pain (L>R) Left knee with fluctuant area of moderate edema and warmth with severe TTP.  Pain is out of proportion to exam. Etiology is unclear. Possibilities include gout, septic joint, and structural abnormalities, Knee X-ray on 2/18/2025 is negative. BLE Arterial Ultrasound on 11/25/2024 revealed atherosclerotic disease throughout both lower extremity arteries with no sonographic evidence to suggest focal hemodynamically significant stenosis. Given severe pain out of proportion to exam resulting in pt's inability to walk, I recommend left knee and immediate Ortho evaluation. Pt will be transported to ED for expedited workup as outlined.   Varicose veins with leg swelling- Check BLE venous reflux study and ASHLEY study. Recommend wearing graduated compression hose.  Limit sodium intake to 2000 mg daily.  Limit volume intake to 1.5 L daily.  Elevate legs when resting.  Obesity- Encourage diet, exercise, and weight loss.    -At follow up clinic visit on 5/27/2025, Mrs. Tena reports improvement in pain and swelling of left knee.  MRI left knee on 3/3/2025 showed a complex tear of the anterior horn of the lateral meniscus, a large suprapatellar effusion with synovitis, some cartilage damage, and bone marrow edema. This is being managed by Ortho. ASHLEY Study on 2/27/2025 revealed normal resting and exercise ABIs bilaterally. Venous Reflux Study on 2/27/2025 revealed hemodynamically significant BLE venous reflux and no DVT. Blood pressure today is 159/87 with pulse 79 bpm.  Plan:  Leg Pain- MRI left knee on 3/3/2025 showed a complex tear of the anterior horn of the lateral meniscus, a large suprapatellar effusion with synovitis, some cartilage damage, and bone marrow edema. Continue management per by Ortho.  Varicose veins with leg swelling- Due to venous insufficiency. Edema is improving. ASHLEY Study on 2/27/2025 revealed normal resting and exercise ABIs bilaterally. Venous Reflux Study on 2/27/2025 revealed hemodynamically significant BLE venous reflux and no DVT. Recommend wearing graduated compression hose.  Limit sodium intake to 2000 mg daily.  Limit volume intake to 1.5 L daily.  Elevate legs when resting.  HTN- Start losartan 25 mg daily. Check cmp today and in 1 week. Pt to keep log of blood pressure/heart rate and bring in next visit for review.   Obesity- Encourage diet, exercise, and weight loss.    HPI:  Mrs. Tena presents for preoperative cardiac risk stratification prior to left arthroscopic knee surgery with Dr. Daniels on 8/4/2025. She reports continued pain and swelling of left knee. States blood pressure has been much better controlled. She has no chest pain, no heart failure symptoms and no arrhythmia. She can perform greater than 4 METS and is limited only by left knee pain. EKG today shows normal sinus rhythm with no ischemic ST/T wave changes.    Past Medical History:   Diagnosis Date    Abnormal uterine bleeding (AUB)     Back pain     CHA (iron deficiency anemia)     Pelvic pain      Past Surgical History:    Procedure Laterality Date    DILATION AND CURETTAGE OF UTERUS USING SUCTION N/A 2/10/2021    Procedure: DILATION AND CURETTAGE, UTERUS, USING SUCTION;  Surgeon: Cristobal Song MD;  Location: [facility] OR;  Service: OB/GYN;  Laterality: N/A;    HYSTEROSCOPY N/A 7/15/2019    Procedure: HYSTEROSCOPY;  Surgeon: Roseann Lauren MD;  Location: [facility] OR;  Service: OB/GYN;  Laterality: N/A;    HYSTEROSCOPY WITH DILATION AND CURETTAGE OF UTERUS N/A 2/10/2021    Procedure: HYSTEROSCOPY, WITH DILATION AND CURETTAGE OF UTERUS;  Surgeon: Cristobal Song MD;  Location: [facility] OR;  Service: OB/GYN;  Laterality: N/A;    SALPINGECTOMY Right 2/10/2021    Procedure: SALPINGECTOMY;  Surgeon: Cristobal Song MD;  Location: [facility] OR;  Service: OB/GYN;  Laterality: Right;    SALPINGOOPHORECTOMY Left 2/10/2021    Procedure: SALPINGO-OOPHORECTOMY;  Surgeon: Cristobal Song MD;  Location: [facility] OR;  Service: OB/GYN;  Laterality: Left;  with Left Ovarian cystectomy    THERMAL ABLATION OF ENDOMETRIUM USING HYSTEROSCOPY N/A 7/15/2019    Procedure: ABLATION, ENDOMETRIUM, THERMAL, HYSTEROSCOPIC;  Surgeon: Roseann Lauren MD;  Location: [facility] OR;  Service: OB/GYN;  Laterality: N/A;    TOTAL ABDOMINAL HYSTERECTOMY N/A 2/10/2021    Procedure: HYSTERECTOMY, TOTAL, ABDOMINAL;  Surgeon: Cristobal Song MD;  Location: [facility];  Service: OB/GYN;  Laterality: N/A;  DONE  EMERGENCY     TUBAL LIGATION       Social History[1]  Family History   Problem Relation Name Age of Onset    Lupus Mother      No Known Problems Father         Review of patient's allergies indicates:  No Known Allergies    Medication List with Changes/Refills   Current Medications    CELECOXIB (CELEBREX) 200 MG CAPSULE    Take 1 capsule (200 mg total) by mouth 2 (two) times daily.    HYDROCODONE-ACETAMINOPHEN (NORCO) 5-325 MG PER TABLET    Take 1 tablet by mouth every 8 (eight) hours as needed for Pain.    LOSARTAN (COZAAR) 25 MG TABLET    Take 1 tablet (25 mg total) by mouth once daily.     "METHYLPREDNISOLONE (MEDROL DOSEPACK) 4 MG TABLET    Take as directed    PRAVASTATIN (PRAVACHOL) 40 MG TABLET    Take 1 tablet by mouth once daily       Review of Systems  Constitution: Denies chills, fever, and sweats.  HENT: Denies headaches or blurry vision.  Cardiovascular: Denies chest pain or irregular heart beat.  Respiratory: Denies cough or shortness of breath.  Gastrointestinal: Denies abdominal pain, nausea, or vomiting.  Musculoskeletal: Denies muscle cramps.  Neurological: Denies dizziness or focal weakness.  Psychiatric/Behavioral: Normal mental status.  Hematologic/Lymphatic: Denies bleeding problem or easy bruising/bleeding.  Skin: Denies rash or suspicious lesions    Physical Examination  BP (!) 141/85 (Patient Position: Sitting)   Pulse 65   Ht 5' 4" (1.626 m)   Wt 90.1 kg (198 lb 10.2 oz)   LMP 06/04/2019   BMI 34.10 kg/m²     Constitutional: No acute distress, conversant  HEENT: Sclera anicteric, Pupils equal, round and reactive to light, extraocular motions intact, Oropharynx clear  Neck: No JVD, no carotid bruits  Cardiovascular: regular rate and rhythm, no murmur, rubs or gallops, normal S1/S2  Pulmonary: Clear to auscultation bilaterally  Abdominal: Abdomen soft, nontender, nondistended, positive bowel sounds  Extremities: BLE's with trace pitting edema and prominent varicose veins  Left knee with fluctuant area of moderate edema and warmth with severe TTP  Right knee with TTP and mild edema   Pulses:  Carotid pulses are 2+ on the right side, and 2+ on the left side.  Radial pulses are 2+ on the right side, and 2+ on the left side.   Femoral pulses are 2+ on the right side, and 2+ on the left side.  Popliteal pulses are 2+ on the right side, and 2+ on the left side.   Dorsalis pedis pulses are 2+ on the right side, and 2+ on the left side.   Posterior tibial pulses are 2+ on the right side, and 2+ on the left side.    Skin: No ecchymosis, erythema, or ulcers  Psych: Alert and oriented x " 3, appropriate affect  Neuro: CNII-XII intact, no focal deficits    Labs:  Most Recent Data  CBC:   Lab Results   Component Value Date    WBC 5.14 07/10/2025    HGB 12.5 07/10/2025    HCT 40.4 07/10/2025     07/10/2025    MCV 90 07/10/2025    RDW 13.3 07/10/2025     BMP:   Lab Results   Component Value Date     07/10/2025    K 4.2 07/10/2025     07/10/2025    CO2 27 07/10/2025    BUN 13 07/10/2025    CREATININE 0.8 07/10/2025    GLU 92 07/10/2025    CALCIUM 9.5 07/10/2025    MG 1.9 04/13/2023    PHOS 3.5 04/13/2023     LFTS;   Lab Results   Component Value Date    PROT 8.1 07/10/2025    ALBUMIN 3.8 07/10/2025    BILITOT 0.3 07/10/2025    AST 15 07/10/2025    ALKPHOS 93 07/10/2025    ALT 10 07/10/2025     COAGS:   Lab Results   Component Value Date    INR 1.0 04/13/2023     FLP:   Lab Results   Component Value Date    CHOL 131 08/30/2024    HDL 39 (L) 08/30/2024    LDLCALC 79.6 08/30/2024    TRIG 62 08/30/2024    CHOLHDL 29.8 08/30/2024     CARDIAC:   Lab Results   Component Value Date    TROPONINI <0.012 04/28/2022    BNP <10 10/14/2020       Imaging:    EKG 7/31/2025:  Normal sinus rhythm with no ischemic ST/T wave changes    ASHLEY Study 2/27/2025:    Normal resting and exercise ABIs bilaterally.    PVR waveforms are mildly to moderately dampened at the low thigh and ankle level bilaterally.     Venous Reflux Study 2/27/2025:    There is no evidence of a right lower extremity DVT.    The right common femoral vein has reflux.    The right greater saphenous vein has reflux.    There is no evidence of a left lower extremity DVT.    The left greater saphenous vein has reflux.    BLE Arterial Ultrasound 11/25/2024:  Atherosclerotic disease throughout both lower extremity arteries with no sonographic evidence to suggest focal hemodynamically significant stenosis.     Assessment/Plan:  Yoni Tena is a 54 y.o. female with HTN, former smoker, obesity, who presents for a follow up appointment.      Leg Pain- MRI left knee on 3/3/2025 showed a complex tear of the anterior horn of the lateral meniscus, a large suprapatellar effusion with synovitis, some cartilage damage, and bone marrow edema. Continue management per by Ortho.    2. Varicose veins with leg swelling- Due to venous insufficiency. Edema is improving. ASHLEY Study on 2025 revealed normal resting and exercise ABIs bilaterally. Venous Reflux Study on 2025 revealed hemodynamically significant BLE venous reflux and no DVT. Recommend wearing graduated compression hose.  Limit sodium intake to 2000 mg daily.  Limit volume intake to 1.5 L daily.  Elevate legs when resting.    3. HTN- Controlled. Continue losartan 25 mg daily.     4. Obesity- Encourage diet, exercise, and weight loss.    5. Preoperative cardiac risk stratification prior to left arthroscopic knee surgery-  Ms. Tena has no chest pain, no heart failure symptoms and no arrhythmia. She can perform greater than 4 METS and is limited only by left knee pain. EKG today shows normal sinus rhythm with no ischemic ST/T wave changes. She is at low risk for a perioperative major adverse cardiac event during this intermediate risk procedure. No further testing indicated. Proceed with surgery.     Follow up in 6 months    Total duration of face to face visit time 20 minutes.  Total time spent counseling greater than fifty percent of total visit time.  Counseling included discussion regarding imaging findings, diagnosis, possibilities, treatment options, risks and benefits.  The patient had many questions regarding the options and long-term effects.    Abbe Cleary MD, PhD  Interventional Cardiology           [1]   Social History  Socioeconomic History    Marital status: Single   Tobacco Use    Smoking status: Former     Current packs/day: 0.00     Types: Cigarettes     Quit date: 2023     Years since quittin.2    Smokeless tobacco: Never    Tobacco comments:     3 cigarettes  per day   Substance and Sexual Activity    Alcohol use: Yes     Comment: occasionally    Drug use: No    Sexual activity: Not Currently     Partners: Male     Birth control/protection: See Surgical Hx     Comment: with one partner for 7 years   Social History Narrative    ** Merged History Encounter **

## 2025-07-31 NOTE — PATIENT INSTRUCTIONS
Assessment/Plan:  Yoni Tena is a 54 y.o. female with HTN, former smoker, obesity, who presents for a follow up appointment.     Leg Pain- MRI left knee on 3/3/2025 showed a complex tear of the anterior horn of the lateral meniscus, a large suprapatellar effusion with synovitis, some cartilage damage, and bone marrow edema. Continue management per by Ortho.    2. Varicose veins with leg swelling- Due to venous insufficiency. Edema is improving. ASHLEY Study on 2/27/2025 revealed normal resting and exercise ABIs bilaterally. Venous Reflux Study on 2/27/2025 revealed hemodynamically significant BLE venous reflux and no DVT. Recommend wearing graduated compression hose.  Limit sodium intake to 2000 mg daily.  Limit volume intake to 1.5 L daily.  Elevate legs when resting.    3. HTN- Controlled. Continue losartan 25 mg daily.     4. Obesity- Encourage diet, exercise, and weight loss.    5. Preoperative cardiac risk stratification prior to left arthroscopic knee surgery-  Ms. Tena has no chest pain, no heart failure symptoms and no arrhythmia. She can perform greater than 4 METS and is limited only by left knee pain. EKG today shows normal sinus rhythm with no ischemic ST/T wave changes. She is at low risk for a perioperative major adverse cardiac event during this intermediate risk procedure. No further testing indicated. Proceed with surgery.     Follow up in 6 months

## 2025-07-31 NOTE — H&P
Yoni Tena  is here for a completion of her perioperative paperwork. she  Is scheduled to undergo:    left   1. Arthroscopic partial meniscectomy versus repair  2. Possible arthroscopic synovectomy  3. Possible arthroscopic loose body removal  4. Possible arthroscopic chondroplasty     on 8/4/2025.      She does need clearance for this procedure.    Patient has been cleared to proceed with surgery.      PAST MEDICAL HISTORY:   Past Medical History:   Diagnosis Date    Abnormal uterine bleeding (AUB)     Back pain     CHA (iron deficiency anemia)     Pelvic pain      PAST SURGICAL HISTORY:   Past Surgical History:   Procedure Laterality Date    DILATION AND CURETTAGE OF UTERUS USING SUCTION N/A 2/10/2021    Procedure: DILATION AND CURETTAGE, UTERUS, USING SUCTION;  Surgeon: Cristobal Song MD;  Location: [facility] OR;  Service: OB/GYN;  Laterality: N/A;    HYSTEROSCOPY N/A 7/15/2019    Procedure: HYSTEROSCOPY;  Surgeon: Roseann Lauren MD;  Location: [facility];  Service: OB/GYN;  Laterality: N/A;    HYSTEROSCOPY WITH DILATION AND CURETTAGE OF UTERUS N/A 2/10/2021    Procedure: HYSTEROSCOPY, WITH DILATION AND CURETTAGE OF UTERUS;  Surgeon: Cristobal Song MD;  Location: [facility];  Service: OB/GYN;  Laterality: N/A;    SALPINGECTOMY Right 2/10/2021    Procedure: SALPINGECTOMY;  Surgeon: Cristobal Song MD;  Location: [facility];  Service: OB/GYN;  Laterality: Right;    SALPINGOOPHORECTOMY Left 2/10/2021    Procedure: SALPINGO-OOPHORECTOMY;  Surgeon: Cristobal Song MD;  Location: [facility] OR;  Service: OB/GYN;  Laterality: Left;  with Left Ovarian cystectomy    THERMAL ABLATION OF ENDOMETRIUM USING HYSTEROSCOPY N/A 7/15/2019    Procedure: ABLATION, ENDOMETRIUM, THERMAL, HYSTEROSCOPIC;  Surgeon: Roseann Lauren MD;  Location: [facility];  Service: OB/GYN;  Laterality: N/A;    TOTAL ABDOMINAL HYSTERECTOMY N/A 2/10/2021    Procedure: HYSTERECTOMY, TOTAL, ABDOMINAL;  Surgeon: Cristobal Song MD;  Location: [facility];  Service: OB/GYN;  " Laterality: N/A;  DONE  EMERGENCY     TUBAL LIGATION       FAMILY HISTORY:   Family History   Problem Relation Name Age of Onset    Lupus Mother      No Known Problems Father       SOCIAL HISTORY: Social History[1]    MEDICATIONS: Current Medications[2]  ALLERGIES: Review of patient's allergies indicates:  No Known Allergies    VITAL SIGNS: BP (!) 152/91 (Patient Position: Sitting)   Pulse 69   Ht 5' 4" (1.626 m)   Wt 89.7 kg (197 lb 10.3 oz)   LMP 06/04/2019   BMI 33.93 kg/m²      Risks, indications and benefits of the surgical procedure were discussed with the patient. All questions with regard to surgery, rehab, expected return to functional activities, activities of daily living and recreational endeavors were answered to her satisfaction.    It was explained to the patient that there may be an increase in surgical risks if the patient has certain co-morbidities such as but not limited to: Obesity, Cardiovascular issues (CHF, CAD, Arrhythmias), chronic pulmonary issues, previous or current neurovascular/neurological issues, previous strokes, diabetes mellitus, previous wound healing issues, previous wound or skin infections, PVD, clotting disorders, if the patient uses chronic steroids, if the patient takes or has immune compromising medications or diseases, or has previously or currently used tobacco products.     The patient verbalized that he/she does not have any additional clotting, bleeding, or blood disorders, other than what is list in her chart on today's review.     Then a brief history and physical exam were performed.    Review of Systems   Constitution: Negative. Negative for chills, fever and night sweats.   HENT: Negative for congestion and headaches.    Eyes: Negative for blurred vision, left vision loss and right vision loss.   Cardiovascular: Negative for chest pain and syncope.   Respiratory: Negative for cough and shortness of breath.    Endocrine: Negative for polydipsia, polyphagia " and polyuria.   Hematologic/Lymphatic: Negative for bleeding problem. Does not bruise/bleed easily.   Skin: Negative for dry skin, itching and rash.   Musculoskeletal: Negative for falls and muscle weakness.   Gastrointestinal: Negative for abdominal pain and bowel incontinence.   Genitourinary: Negative for bladder incontinence and nocturia.   Neurological: Negative for disturbances in coordination, loss of balance and seizures.   Psychiatric/Behavioral: Negative for depression. The patient does not have insomnia.    Allergic/Immunologic: Negative for hives and persistent infections.     PHYSICAL EXAM:  GEN: A&Ox3, WD WN NAD  HEENT: WNL  CHEST: CTAB, no W/R/R  HEART: RRR, no M/R/G  ABD: Soft, NT ND, BS x4 QUADS  MS; See Epic  NEURO: CN II-XII intact       The surgical consent was then reviewed with the patient, who agreed with all the contents of the consent form and it was signed. she was then given the [facility] surgery packet to bring with her to surgery for the anesthesia portion of her perioperative paperwork.   For all physicians except for Dr. Daniels, we will email and possibly fax the consent forms and booking sheets to [facility] pre-admit.    The patient was given the opportunity to ask questions about the surgical plan and consent form, and once no other questions were asked, I proceeded with the pre-op appointment.    PHYSICAL THERAPY:  She was also instructed regarding physical therapy and will begin on POD#1 at the [facility] location.    POST OP CARE:instructions were reviewed including care of the wound and dressing after surgery and when she can shower.     CRUTCHES OR WALKER: It was explained to the patient that if they are having a lower extremity surgery that they will require either a walker or crutches to ambulate safely with after surgery. It was explained that a cane or other assistive devices are not sufficient to safely ambulate with after surgery. I explained  to the patient that I will place an order for them to receive either crutches or a walker after surgery to go home with. It was explained that if they have crutches or a walker at home already, that they are REQUIRED to bring them to the hospital on the day of surgery. It was explained that if they do not have them at the hospital on the day of surgery that they WILL be provided a new pair or crutches or a walker to go home with to ensure ambulation will be safe if the patient needs to stop somewhere on the way home.      PAIN MANAGEMENT: Yoni Tena was also given their pain management regimen, which includes the TENS unit given to her by [facility] along with the education required for its use.    PAIN MEDICATION:  Norco 7.5/325mg 1 po q 4-6 hours prn pain  Ultram 50 mg Take 1-2 p.o. q.6 hours p.r.n. breakthrough pain,   Phenergan 25 mg one p.o. q.6 hours p.r.n. nausea and vomiting.    Post op meds to be delivered bedside prior to discharge. Deliver to family if patient is in surgery at 5pm.    The patient was told that narcotic pain medications may make them drowsy and instructions were given to not sign legal documents, drive or operate heavy machinery, cars, or equipment while under the influence of narcotic medications. The patient was told and understands that narcotic pain medications should only be used as needed to control pain and that other options of pain control include TENs unit and ice packs/unit.     Patient was instructed to purchase and take Colace to counter possible GI side effects of taking opiates.     DVT prophylaxis was discussed with the patient today including risk factors for developing DVTs and history of DVTs. The patient was asked if any specific recommendations were given from the doctor/s that did pre-operative surgical clearance. The patient was then given an education sheet about DVTs and PE with warning signs and symptoms of both and steps to take if they suspect  either of these.    Patient was asked if they were taking or using OCP pills or devices. If they answered yes, then they were instructed to stop using OCPs at this pre-operative appointment until 2 months post-op to help prevent DVT development. They understand that there are other forms of birth control that do not involve hormones. They expressed understanding that ignoring/not following this instruction could result in a DVT which could turn into a deadly pulmonary embolism.     This along with the Modified Caprini risk assessment model for VTE in general surgical patients was used to determine the patient's DVT risk.     From: Jacek MK, Odin DA, Nabila SM, et al. Prevention of VTE in nonorthopedic surgical patients: antithrombotic therapy and prevention of thrombosis, 9th ed: American College of Chest [employer]icians evidence-based clinical practical guidelines. Chest 2012; 141:e227S. Copyright © 2012. Reproduced with permission from the American College of Chest Physicians.    The below listed DVT prophylaxis regimen was discussed along with SCDs during surgery and bilateral SUSAN compression stockings to be used post-op. Length of treatment has been determined to be 10-42 days post-op by the above noted Caprini assessment model. Early ambulation post-op was also discussed and emphasized with the patient.     Patient was instructed to buy and take:  Aspirin 325mg QD x 3 weeks for DVT prophylaxis starting on the evening after surgery.  Patient will also use bilateral TEDs on lower extremities, SCDs during surgery, and early ambulation post-op. If the patient was previously taking 81mg baby aspirin, they were told to not take it will using the above stated aspirin and to restart the 81mg aspirin after completion of the aspirin dose.      Patient was also told to buy over the counter Prilosec medication and take it once daily for GI protection as long as they are taking NSAIDs or Aspirin.     Published data in  Wagner LOCO, et al. J Arthroplasty. Oct; 31(10):2237-40, 2016; showed that aspirin use as prophylaxis during revision total joint arthroplasty was more effective than warfarin in preventing symptomatic venous thromboembolic events and was associated with lower complications.  Patients in the study were also treated with intermittent pneumatic compression devices. Compression stockings would be our method of mechanical prophylaxis, which has been shown to be similar to pneumatic compression in the systematic review, Reynaldo RJ, et al. Rosalie Surg. Feb; 239(2): 162-171, 2004.     Results showed a significantly higher incidence of symptomatic venous thromboembolic events among patients in the warfarin group vs. the aspirin group (1.75% vs. 0.56%). Researchers also noted a bleeding event rate of 1.5% among patients who received warfarin compared with a rate of 0.4% among patients who received aspirin.    Patient denies history of seizures.     I explained to following and the patient expressed understanding:  The patient is currently aware of the COVID19 pandemic and that proceeding with their surgical procedure could potentially increase exposure to coronavirus in the community. The patient understands that there is the possibility of delayed or cancelled appts or PT visits in the future. They understand that infection with the coronavirus could complicate their surgery recovery. They are aware of the current policies and procedures of [facility] and the government regarding the pandemic and they were given the option of delaying my surgery. The patient elects to proceed with surgery at this time.     The patient was instructed to practice strict social distancing, hand washing/hygiene, respiratory hygiene, and cough etiquette from now until 6 weeks following surgery to reduce the risk of dion coronavirus.    As there were no other questions to be asked, she was given my business card along with Agustín Daniels MD  business card if she has any questions or concerns prior to surgery or in the postop period.          [1]   Social History  Socioeconomic History    Marital status: Single   Tobacco Use    Smoking status: Former     Current packs/day: 0.00     Types: Cigarettes     Quit date: 2023     Years since quittin.2    Smokeless tobacco: Never    Tobacco comments:     3 cigarettes per day   Substance and Sexual Activity    Alcohol use: Yes     Comment: occasionally    Drug use: No    Sexual activity: Not Currently     Partners: Male     Birth control/protection: See Surgical Hx     Comment: with one partner for 7 years   Social History Narrative    ** Merged History Encounter **        [2]   Current Outpatient Medications:     losartan (COZAAR) 25 MG tablet, Take 1 tablet (25 mg total) by mouth once daily., Disp: 30 tablet, Rfl: 11    aspirin 325 MG tablet, Take 1 tablet (325 mg total) by mouth once daily. for 21 days, Disp: 21 tablet, Rfl: 0    celecoxib (CELEBREX) 200 MG capsule, Take 1 capsule (200 mg total) by mouth 2 (two) times daily. (Patient not taking: Reported on 2025), Disp: 60 capsule, Rfl: 1    HYDROcodone-acetaminophen (NORCO) 5-325 mg per tablet, Take 1 tablet by mouth every 8 (eight) hours as needed for Pain. (Patient not taking: Reported on 6/10/2025), Disp: 12 tablet, Rfl: 0    HYDROcodone-acetaminophen (NORCO) 7.5-325 mg per tablet, Take 1 tablet by mouth every 6 (six) hours as needed for Pain., Disp: 20 tablet, Rfl: 0    methylPREDNISolone (MEDROL DOSEPACK) 4 mg tablet, Take as directed (Patient not taking: Reported on 2025), Disp: 1 each, Rfl: 0    pravastatin (PRAVACHOL) 40 MG tablet, Take 1 tablet by mouth once daily (Patient not taking: Reported on 2025), Disp: 30 tablet, Rfl: 11    promethazine (PHENERGAN) 25 MG tablet, Take 1 tablet (25 mg total) by mouth every 6 (six) hours as needed for Nausea., Disp: 20 tablet, Rfl: 0    traMADoL (ULTRAM) 50 mg tablet, Take 1 tablet (50 mg  total) by mouth every 6 (six) hours as needed for Pain., Disp: 20 tablet, Rfl: 0

## 2025-07-31 NOTE — TELEPHONE ENCOUNTER
Called and informed patient of arrival time for surgery scheduled with Dr. Agustín Daniels MD on 8/4/2025 . Informed patient to arrive for 11:30 am. Patient expressed understanding and agreed with treatment plan.

## 2025-08-01 ENCOUNTER — TELEPHONE (OUTPATIENT)
Dept: SPORTS MEDICINE | Facility: CLINIC | Age: 55
End: 2025-08-01

## 2025-08-01 NOTE — TELEPHONE ENCOUNTER
Called and spoke with patient to inform her we could reach out once approved by her insurance to reschedule 8/4/2025 surgery with  Dr. Agustín Daniels MD. Patient expressed understanding and agreed with treatment plan.

## 2025-08-04 ENCOUNTER — ANESTHESIA (OUTPATIENT)
Dept: SURGERY | Facility: HOSPITAL | Age: 55
End: 2025-08-04
Payer: MEDICAID

## 2025-08-04 PROBLEM — Z01.810 PREOPERATIVE CARDIOVASCULAR EXAMINATION: Status: RESOLVED | Noted: 2025-07-31 | Resolved: 2025-08-04

## 2025-08-06 ENCOUNTER — PATIENT MESSAGE (OUTPATIENT)
Dept: SPORTS MEDICINE | Facility: CLINIC | Age: 55
End: 2025-08-06
Payer: MEDICAID

## 2025-08-07 ENCOUNTER — PATIENT MESSAGE (OUTPATIENT)
Dept: SPORTS MEDICINE | Facility: CLINIC | Age: 55
End: 2025-08-07
Payer: MEDICAID

## 2025-08-07 DIAGNOSIS — G89.29 CHRONIC PAIN OF LEFT KNEE: ICD-10-CM

## 2025-08-07 DIAGNOSIS — M25.562 CHRONIC PAIN OF LEFT KNEE: ICD-10-CM

## 2025-08-07 DIAGNOSIS — M23.201 OLD COMPLEX TEAR OF LATERAL MENISCUS OF LEFT KNEE: Primary | ICD-10-CM

## 2025-08-12 ENCOUNTER — TELEPHONE (OUTPATIENT)
Dept: SPORTS MEDICINE | Facility: CLINIC | Age: 55
End: 2025-08-12
Payer: MEDICAID

## 2025-08-13 ENCOUNTER — HOSPITAL ENCOUNTER (OUTPATIENT)
Facility: HOSPITAL | Age: 55
Discharge: HOME | End: 2025-08-13
Attending: ORTHOPAEDIC SURGERY | Admitting: ORTHOPAEDIC SURGERY
Payer: MEDICAID

## 2025-08-13 VITALS
HEIGHT: 64 IN | HEART RATE: 66 BPM | WEIGHT: 197 LBS | SYSTOLIC BLOOD PRESSURE: 159 MMHG | OXYGEN SATURATION: 100 % | RESPIRATION RATE: 16 BRPM | TEMPERATURE: 98 F | DIASTOLIC BLOOD PRESSURE: 78 MMHG | BODY MASS INDEX: 33.63 KG/M2

## 2025-08-13 DIAGNOSIS — M17.12 PRIMARY OSTEOARTHRITIS OF LEFT KNEE: Primary | ICD-10-CM

## 2025-08-13 DIAGNOSIS — M23.201 OLD COMPLEX TEAR OF LATERAL MENISCUS OF LEFT KNEE: ICD-10-CM

## 2025-08-13 PROCEDURE — 25000003 PHARM REV CODE 250: Performed by: PHYSICIAN ASSISTANT

## 2025-08-13 PROCEDURE — 37000008 HC ANESTHESIA 1ST 15 MINUTES: Performed by: ORTHOPAEDIC SURGERY

## 2025-08-13 PROCEDURE — 71000033 HC RECOVERY, INTIAL HOUR: Performed by: ORTHOPAEDIC SURGERY

## 2025-08-13 PROCEDURE — 36000710: Performed by: ORTHOPAEDIC SURGERY

## 2025-08-13 PROCEDURE — 29880 ARTHRS KNE SRG MNISECTMY M&L: CPT | Mod: LT,,, | Performed by: ORTHOPAEDIC SURGERY

## 2025-08-13 PROCEDURE — 71000015 HC POSTOP RECOV 1ST HR: Performed by: ORTHOPAEDIC SURGERY

## 2025-08-13 PROCEDURE — 63600175 PHARM REV CODE 636 W HCPCS: Performed by: PHYSICIAN ASSISTANT

## 2025-08-13 PROCEDURE — 63600175 PHARM REV CODE 636 W HCPCS: Performed by: ANESTHESIOLOGY

## 2025-08-13 PROCEDURE — 94761 N-INVAS EAR/PLS OXIMETRY MLT: CPT

## 2025-08-13 PROCEDURE — 25000003 PHARM REV CODE 250: Performed by: NURSE ANESTHETIST, CERTIFIED REGISTERED

## 2025-08-13 PROCEDURE — 27201423 OPTIME MED/SURG SUP & DEVICES STERILE SUPPLY: Performed by: ORTHOPAEDIC SURGERY

## 2025-08-13 PROCEDURE — 64447 NJX AA&/STRD FEMORAL NRV IMG: CPT

## 2025-08-13 PROCEDURE — 99900035 HC TECH TIME PER 15 MIN (STAT)

## 2025-08-13 PROCEDURE — 37000009 HC ANESTHESIA EA ADD 15 MINS: Performed by: ORTHOPAEDIC SURGERY

## 2025-08-13 PROCEDURE — 63600175 PHARM REV CODE 636 W HCPCS: Performed by: ORTHOPAEDIC SURGERY

## 2025-08-13 PROCEDURE — 36000711: Performed by: ORTHOPAEDIC SURGERY

## 2025-08-13 PROCEDURE — 25000003 PHARM REV CODE 250: Performed by: ANESTHESIOLOGY

## 2025-08-13 PROCEDURE — 63600175 PHARM REV CODE 636 W HCPCS: Performed by: NURSE ANESTHETIST, CERTIFIED REGISTERED

## 2025-08-13 RX ORDER — MIDAZOLAM HYDROCHLORIDE 1 MG/ML
INJECTION INTRAMUSCULAR; INTRAVENOUS
Status: DISCONTINUED | OUTPATIENT
Start: 2025-08-13 | End: 2025-08-13

## 2025-08-13 RX ORDER — SODIUM CHLORIDE 9 MG/ML
INJECTION, SOLUTION INTRAVENOUS CONTINUOUS
Status: DISCONTINUED | OUTPATIENT
Start: 2025-08-13 | End: 2025-08-13 | Stop reason: HOSPADM

## 2025-08-13 RX ORDER — CELECOXIB 200 MG/1
400 CAPSULE ORAL
Status: COMPLETED | OUTPATIENT
Start: 2025-08-13 | End: 2025-08-13

## 2025-08-13 RX ORDER — SODIUM CHLORIDE 0.9 % (FLUSH) 0.9 %
3 SYRINGE (ML) INJECTION
Status: DISCONTINUED | OUTPATIENT
Start: 2025-08-13 | End: 2025-08-13 | Stop reason: HOSPADM

## 2025-08-13 RX ORDER — PROPOFOL 10 MG/ML
VIAL (ML) INTRAVENOUS
Status: DISCONTINUED | OUTPATIENT
Start: 2025-08-13 | End: 2025-08-13

## 2025-08-13 RX ORDER — METHOCARBAMOL 500 MG/1
1000 TABLET, FILM COATED ORAL ONCE AS NEEDED
Status: COMPLETED | OUTPATIENT
Start: 2025-08-13 | End: 2025-08-13

## 2025-08-13 RX ORDER — EPINEPHRINE 1 MG/ML
INJECTION, SOLUTION, CONCENTRATE INTRAVENOUS
Status: DISCONTINUED | OUTPATIENT
Start: 2025-08-13 | End: 2025-08-13 | Stop reason: HOSPADM

## 2025-08-13 RX ORDER — CEFAZOLIN 2 G/1
2 INJECTION, POWDER, FOR SOLUTION INTRAMUSCULAR; INTRAVENOUS
Status: COMPLETED | OUTPATIENT
Start: 2025-08-13 | End: 2025-08-13

## 2025-08-13 RX ORDER — OXYCODONE HYDROCHLORIDE 5 MG/1
5 TABLET ORAL EVERY 4 HOURS PRN
Status: DISCONTINUED | OUTPATIENT
Start: 2025-08-13 | End: 2025-08-13 | Stop reason: HOSPADM

## 2025-08-13 RX ORDER — DEXAMETHASONE SODIUM PHOSPHATE 4 MG/ML
INJECTION, SOLUTION INTRA-ARTICULAR; INTRALESIONAL; INTRAMUSCULAR; INTRAVENOUS; SOFT TISSUE
Status: DISCONTINUED | OUTPATIENT
Start: 2025-08-13 | End: 2025-08-13

## 2025-08-13 RX ORDER — ONDANSETRON HYDROCHLORIDE 2 MG/ML
INJECTION, SOLUTION INTRAVENOUS
Status: DISCONTINUED | OUTPATIENT
Start: 2025-08-13 | End: 2025-08-13

## 2025-08-13 RX ORDER — FENTANYL CITRATE 50 UG/ML
INJECTION, SOLUTION INTRAMUSCULAR; INTRAVENOUS
Status: DISCONTINUED | OUTPATIENT
Start: 2025-08-13 | End: 2025-08-13

## 2025-08-13 RX ORDER — KETAMINE HCL IN 0.9 % NACL 50 MG/5 ML
SYRINGE (ML) INTRAVENOUS
Status: DISCONTINUED | OUTPATIENT
Start: 2025-08-13 | End: 2025-08-13

## 2025-08-13 RX ORDER — FENTANYL CITRATE 50 UG/ML
25 INJECTION, SOLUTION INTRAMUSCULAR; INTRAVENOUS EVERY 5 MIN PRN
Refills: 0 | Status: DISCONTINUED | OUTPATIENT
Start: 2025-08-13 | End: 2025-08-13 | Stop reason: HOSPADM

## 2025-08-13 RX ORDER — BUPIVACAINE HYDROCHLORIDE 2.5 MG/ML
INJECTION, SOLUTION EPIDURAL; INFILTRATION; INTRACAUDAL; PERINEURAL
Status: COMPLETED | OUTPATIENT
Start: 2025-08-13 | End: 2025-08-13

## 2025-08-13 RX ORDER — MIDAZOLAM HYDROCHLORIDE 1 MG/ML
1 INJECTION, SOLUTION INTRAMUSCULAR; INTRAVENOUS
Status: DISCONTINUED | OUTPATIENT
Start: 2025-08-13 | End: 2025-08-13 | Stop reason: HOSPADM

## 2025-08-13 RX ORDER — ACETAMINOPHEN 500 MG
1000 TABLET ORAL
Status: COMPLETED | OUTPATIENT
Start: 2025-08-13 | End: 2025-08-13

## 2025-08-13 RX ORDER — LIDOCAINE HYDROCHLORIDE 20 MG/ML
INJECTION INTRAVENOUS
Status: DISCONTINUED | OUTPATIENT
Start: 2025-08-13 | End: 2025-08-13

## 2025-08-13 RX ORDER — FENTANYL CITRATE 50 UG/ML
100 INJECTION, SOLUTION INTRAMUSCULAR; INTRAVENOUS
Status: DISCONTINUED | OUTPATIENT
Start: 2025-08-13 | End: 2025-08-13 | Stop reason: HOSPADM

## 2025-08-13 RX ORDER — ONDANSETRON HYDROCHLORIDE 2 MG/ML
4 INJECTION, SOLUTION INTRAVENOUS ONCE AS NEEDED
Status: DISCONTINUED | OUTPATIENT
Start: 2025-08-13 | End: 2025-08-13 | Stop reason: HOSPADM

## 2025-08-13 RX ADMIN — FENTANYL CITRATE 25 MCG: 50 INJECTION INTRAMUSCULAR; INTRAVENOUS at 03:08

## 2025-08-13 RX ADMIN — FENTANYL CITRATE 50 MCG: 50 INJECTION, SOLUTION INTRAMUSCULAR; INTRAVENOUS at 01:08

## 2025-08-13 RX ADMIN — SODIUM CHLORIDE: 0.9 INJECTION, SOLUTION INTRAVENOUS at 01:08

## 2025-08-13 RX ADMIN — SODIUM CHLORIDE: 0.9 INJECTION, SOLUTION INTRAVENOUS at 10:08

## 2025-08-13 RX ADMIN — BUPIVACAINE HYDROCHLORIDE 20 ML: 2.5 INJECTION, SOLUTION EPIDURAL; INFILTRATION; INTRACAUDAL at 01:08

## 2025-08-13 RX ADMIN — MIDAZOLAM HYDROCHLORIDE 1 MG: 2 INJECTION, SOLUTION INTRAMUSCULAR; INTRAVENOUS at 01:08

## 2025-08-13 RX ADMIN — CELECOXIB 400 MG: 200 CAPSULE ORAL at 10:08

## 2025-08-13 RX ADMIN — ACETAMINOPHEN 1000 MG: 500 TABLET ORAL at 10:08

## 2025-08-13 RX ADMIN — ONDANSETRON 4 MG: 2 INJECTION INTRAMUSCULAR; INTRAVENOUS at 01:08

## 2025-08-13 RX ADMIN — FENTANYL CITRATE 50 MCG: 50 INJECTION INTRAMUSCULAR; INTRAVENOUS at 12:08

## 2025-08-13 RX ADMIN — LIDOCAINE HYDROCHLORIDE 100 MG: 20 INJECTION INTRAVENOUS at 01:08

## 2025-08-13 RX ADMIN — OXYCODONE 5 MG: 5 TABLET ORAL at 03:08

## 2025-08-13 RX ADMIN — METHOCARBAMOL 1000 MG: 500 TABLET ORAL at 03:08

## 2025-08-13 RX ADMIN — CEFAZOLIN 2 G: 2 INJECTION, POWDER, FOR SOLUTION INTRAMUSCULAR; INTRAVENOUS at 01:08

## 2025-08-13 RX ADMIN — Medication 20 MG: at 01:08

## 2025-08-13 RX ADMIN — MIDAZOLAM HYDROCHLORIDE 2 MG: 1 INJECTION, SOLUTION INTRAMUSCULAR; INTRAVENOUS at 12:08

## 2025-08-13 RX ADMIN — PROPOFOL 150 MG: 10 INJECTION, EMULSION INTRAVENOUS at 01:08

## 2025-08-13 RX ADMIN — DEXAMETHASONE SODIUM PHOSPHATE 8 MG: 4 INJECTION, SOLUTION INTRAMUSCULAR; INTRAVENOUS at 01:08

## 2025-08-15 ENCOUNTER — CLINICAL SUPPORT (OUTPATIENT)
Dept: REHABILITATION | Facility: HOSPITAL | Age: 55
End: 2025-08-15
Attending: ORTHOPAEDIC SURGERY
Payer: MEDICAID

## 2025-08-15 DIAGNOSIS — M23.201 OLD COMPLEX TEAR OF LATERAL MENISCUS OF LEFT KNEE: ICD-10-CM

## 2025-08-15 DIAGNOSIS — M25.562 CHRONIC PAIN OF LEFT KNEE: ICD-10-CM

## 2025-08-15 DIAGNOSIS — G89.29 CHRONIC PAIN OF LEFT KNEE: ICD-10-CM

## 2025-08-15 PROCEDURE — 97161 PT EVAL LOW COMPLEX 20 MIN: CPT | Mod: PN

## 2025-08-15 PROCEDURE — 97110 THERAPEUTIC EXERCISES: CPT | Mod: PN

## 2025-08-19 ENCOUNTER — CLINICAL SUPPORT (OUTPATIENT)
Dept: REHABILITATION | Facility: HOSPITAL | Age: 55
End: 2025-08-19
Payer: MEDICAID

## 2025-08-19 DIAGNOSIS — M23.201 OLD COMPLEX TEAR OF LATERAL MENISCUS OF LEFT KNEE: Primary | ICD-10-CM

## 2025-08-19 PROCEDURE — 97110 THERAPEUTIC EXERCISES: CPT | Mod: PN,CQ

## 2025-08-21 ENCOUNTER — CLINICAL SUPPORT (OUTPATIENT)
Dept: REHABILITATION | Facility: HOSPITAL | Age: 55
End: 2025-08-21
Payer: MEDICAID

## 2025-08-21 DIAGNOSIS — M23.201 OLD COMPLEX TEAR OF LATERAL MENISCUS OF LEFT KNEE: Primary | ICD-10-CM

## 2025-08-21 PROCEDURE — 97110 THERAPEUTIC EXERCISES: CPT | Mod: PN,CQ

## 2025-08-26 ENCOUNTER — CLINICAL SUPPORT (OUTPATIENT)
Dept: REHABILITATION | Facility: HOSPITAL | Age: 55
End: 2025-08-26
Payer: MEDICAID

## 2025-08-26 DIAGNOSIS — G89.29 CHRONIC PAIN OF LEFT KNEE: ICD-10-CM

## 2025-08-26 DIAGNOSIS — M23.201 OLD COMPLEX TEAR OF LATERAL MENISCUS OF LEFT KNEE: Primary | ICD-10-CM

## 2025-08-26 DIAGNOSIS — M25.562 CHRONIC PAIN OF LEFT KNEE: ICD-10-CM

## 2025-08-26 PROCEDURE — 97110 THERAPEUTIC EXERCISES: CPT | Mod: PN

## 2025-08-27 ENCOUNTER — OFFICE VISIT (OUTPATIENT)
Dept: SPORTS MEDICINE | Facility: CLINIC | Age: 55
End: 2025-08-27
Payer: COMMERCIAL

## 2025-08-27 VITALS
SYSTOLIC BLOOD PRESSURE: 144 MMHG | HEART RATE: 67 BPM | WEIGHT: 196.19 LBS | DIASTOLIC BLOOD PRESSURE: 86 MMHG | BODY MASS INDEX: 33.68 KG/M2

## 2025-08-27 DIAGNOSIS — Z98.890 S/P ARTHROSCOPIC SURGERY OF LEFT KNEE: Primary | ICD-10-CM

## 2025-08-27 DIAGNOSIS — Z09 SURGERY FOLLOW-UP EXAMINATION: ICD-10-CM

## 2025-08-27 PROCEDURE — 99213 OFFICE O/P EST LOW 20 MIN: CPT | Mod: PBBFAC | Performed by: PHYSICIAN ASSISTANT

## 2025-08-27 PROCEDURE — 99999 PR PBB SHADOW E&M-EST. PATIENT-LVL III: CPT | Mod: PBBFAC,,, | Performed by: PHYSICIAN ASSISTANT

## 2025-08-28 ENCOUNTER — CLINICAL SUPPORT (OUTPATIENT)
Dept: REHABILITATION | Facility: HOSPITAL | Age: 55
End: 2025-08-28
Payer: MEDICAID

## 2025-08-28 DIAGNOSIS — G89.29 CHRONIC PAIN OF LEFT KNEE: ICD-10-CM

## 2025-08-28 DIAGNOSIS — M25.562 CHRONIC PAIN OF LEFT KNEE: ICD-10-CM

## 2025-08-28 DIAGNOSIS — M23.201 OLD COMPLEX TEAR OF LATERAL MENISCUS OF LEFT KNEE: Primary | ICD-10-CM

## 2025-08-28 PROCEDURE — 97110 THERAPEUTIC EXERCISES: CPT | Mod: PN

## 2025-09-02 ENCOUNTER — CLINICAL SUPPORT (OUTPATIENT)
Dept: REHABILITATION | Facility: HOSPITAL | Age: 55
End: 2025-09-02

## 2025-09-02 DIAGNOSIS — M23.201 OLD COMPLEX TEAR OF LATERAL MENISCUS OF LEFT KNEE: Primary | ICD-10-CM

## 2025-09-02 DIAGNOSIS — G89.29 CHRONIC PAIN OF LEFT KNEE: ICD-10-CM

## 2025-09-02 DIAGNOSIS — M25.562 CHRONIC PAIN OF LEFT KNEE: ICD-10-CM

## 2025-09-02 PROCEDURE — 97110 THERAPEUTIC EXERCISES: CPT | Mod: PN

## 2025-09-04 ENCOUNTER — CLINICAL SUPPORT (OUTPATIENT)
Dept: REHABILITATION | Facility: HOSPITAL | Age: 55
End: 2025-09-04

## 2025-09-04 DIAGNOSIS — M23.201 OLD COMPLEX TEAR OF LATERAL MENISCUS OF LEFT KNEE: Primary | ICD-10-CM

## 2025-09-04 DIAGNOSIS — M25.562 CHRONIC PAIN OF LEFT KNEE: ICD-10-CM

## 2025-09-04 DIAGNOSIS — G89.29 CHRONIC PAIN OF LEFT KNEE: ICD-10-CM

## 2025-09-04 PROCEDURE — 97110 THERAPEUTIC EXERCISES: CPT | Mod: PN

## (undated) DEVICE — DRESSING XEROFORM NONADH 1X8IN

## (undated) DEVICE — GLOVE SENSICARE PI SURG 7

## (undated) DEVICE — CLOSURE SKIN STERI STRIP 1/2X4

## (undated) DEVICE — Device

## (undated) DEVICE — PAD ABDOMINAL STERILE 8X10IN

## (undated) DEVICE — UNDERGLOVES BIOGEL PI SIZE 8

## (undated) DEVICE — DRAPE STERI U-SHAPED 47X51IN

## (undated) DEVICE — ADHESIVE MASTISOL VIAL 48/BX

## (undated) DEVICE — GLOVE SENSICARE PI GRN 7

## (undated) DEVICE — SPONGE COTTON TRAY 4X4IN

## (undated) DEVICE — WRAP KNEE ACCU THERM GEL PACK

## (undated) DEVICE — SOL NACL IRR 3000ML

## (undated) DEVICE — DRAPE TOP 53X102IN

## (undated) DEVICE — BLADE SHAVER LANZA 4.2X13CM

## (undated) DEVICE — GOWN ECLIPSE REINF LV4 XLNG XL

## (undated) DEVICE — DRAPE ARTHSCP FLD CTRL POUCH

## (undated) DEVICE — COVER CAMERA OPERATING ROOM

## (undated) DEVICE — PAD ELECTRODE STER 1.5X3

## (undated) DEVICE — TOURNIQUET SB QC DP 34X4IN

## (undated) DEVICE — SUT MONOCRYL 4-0 PS-2

## (undated) DEVICE — BNDG COFLEX FOAM LF2 ST 6X5YD

## (undated) DEVICE — SOL NACL IRR 1000ML BTL

## (undated) DEVICE — GLOVE BIOGEL SKINSENSE PI 8.0